# Patient Record
Sex: FEMALE | Race: WHITE | NOT HISPANIC OR LATINO | Employment: OTHER | URBAN - METROPOLITAN AREA
[De-identification: names, ages, dates, MRNs, and addresses within clinical notes are randomized per-mention and may not be internally consistent; named-entity substitution may affect disease eponyms.]

---

## 2023-12-12 ENCOUNTER — TELEMEDICINE (OUTPATIENT)
Dept: UROLOGY | Facility: CLINIC | Age: 43
End: 2023-12-12
Payer: MEDICARE

## 2023-12-12 DIAGNOSIS — N95.8 GENITOURINARY SYNDROME OF MENOPAUSE: Primary | ICD-10-CM

## 2023-12-12 DIAGNOSIS — N39.0 RECURRENT UTI: ICD-10-CM

## 2023-12-12 PROCEDURE — 99214 OFFICE O/P EST MOD 30 MIN: CPT | Performed by: STUDENT IN AN ORGANIZED HEALTH CARE EDUCATION/TRAINING PROGRAM

## 2023-12-12 RX ORDER — METHENAMINE HIPPURATE 1000 MG/1
1 TABLET ORAL 2 TIMES DAILY
Qty: 60 TABLET | Refills: 11 | Status: SHIPPED | OUTPATIENT
Start: 2023-12-12 | End: 2024-12-11

## 2023-12-12 NOTE — PROGRESS NOTES
CHIEF COMPLAINT: Virtual FUV 6 months UTI           HISTORY OF PRESENT ILLNESS:  This is a 43 y.o. female,who presents with a virtual 6 months follow up visit for a UTI.  Doing well, no infections on methenamine. No AE             HISTORY OF PRESENT ILLNESS:           Past Medical History  She has no past medical history on file.    Surgical History  She has a past surgical history that includes Other surgical history (07/12/2022); Other surgical history (07/12/2022); Other surgical history (07/12/2022); and Other surgical history (07/12/2022).     Social History  She has no history on file for tobacco use, alcohol use, and drug use.    Family History  No family history on file.     Allergies  Patient has no known allergies.      A comprehensive 10+ review of systems was negative except for: see hpi              Assessment:    43-year-old with cerebral palsy and neurogenic bladder with history of recurrent UTI     Carrie:  no UTIs since 1/2022  continue methenamine  Standing culture placed  CIC 3x/day     Follow-up in 6 months         Zachary Frederick MD

## 2024-01-12 ENCOUNTER — LAB REQUISITION (OUTPATIENT)
Dept: LAB | Facility: HOSPITAL | Age: 44
End: 2024-01-12
Payer: MEDICARE

## 2024-01-12 DIAGNOSIS — G80.0 SPASTIC QUADRIPLEGIC CEREBRAL PALSY (MULTI): ICD-10-CM

## 2024-01-12 DIAGNOSIS — Z79.899 OTHER LONG TERM (CURRENT) DRUG THERAPY: ICD-10-CM

## 2024-01-12 DIAGNOSIS — G40.911 EPILEPSY, UNSPECIFIED, INTRACTABLE, WITH STATUS EPILEPTICUS (MULTI): ICD-10-CM

## 2024-01-12 DIAGNOSIS — E55.9 VITAMIN D DEFICIENCY, UNSPECIFIED: ICD-10-CM

## 2024-01-12 DIAGNOSIS — K21.9 GASTRO-ESOPHAGEAL REFLUX DISEASE WITHOUT ESOPHAGITIS: ICD-10-CM

## 2024-01-12 DIAGNOSIS — D64.9 ANEMIA, UNSPECIFIED: ICD-10-CM

## 2024-01-12 DIAGNOSIS — Z51.81 ENCOUNTER FOR THERAPEUTIC DRUG LEVEL MONITORING: ICD-10-CM

## 2024-01-12 LAB
25(OH)D3 SERPL-MCNC: 46 NG/ML (ref 30–100)
ALBUMIN SERPL BCP-MCNC: 3.6 G/DL (ref 3.4–5)
ALBUMIN SERPL BCP-MCNC: 3.6 G/DL (ref 3.4–5)
ALP SERPL-CCNC: 169 U/L (ref 33–110)
ALP SERPL-CCNC: 169 U/L (ref 33–110)
ALT SERPL W P-5'-P-CCNC: 36 U/L (ref 7–45)
ALT SERPL W P-5'-P-CCNC: 36 U/L (ref 7–45)
ANION GAP SERPL CALC-SCNC: 11 MMOL/L (ref 10–20)
AST SERPL W P-5'-P-CCNC: 30 U/L (ref 9–39)
AST SERPL W P-5'-P-CCNC: 30 U/L (ref 9–39)
BASOPHILS # BLD AUTO: 0.02 X10*3/UL (ref 0–0.1)
BASOPHILS NFR BLD AUTO: 0.3 %
BILIRUB DIRECT SERPL-MCNC: 0.1 MG/DL (ref 0–0.3)
BILIRUB SERPL-MCNC: 0.3 MG/DL (ref 0–1.2)
BILIRUB SERPL-MCNC: 0.3 MG/DL (ref 0–1.2)
BUN SERPL-MCNC: 11 MG/DL (ref 6–23)
CALCIUM SERPL-MCNC: 8.5 MG/DL (ref 8.6–10.3)
CHLORIDE SERPL-SCNC: 103 MMOL/L (ref 98–107)
CO2 SERPL-SCNC: 26 MMOL/L (ref 21–32)
CREAT SERPL-MCNC: 0.3 MG/DL (ref 0.5–1.05)
CRP SERPL-MCNC: 0.41 MG/DL
EGFRCR SERPLBLD CKD-EPI 2021: >90 ML/MIN/1.73M*2
EOSINOPHIL # BLD AUTO: 0.14 X10*3/UL (ref 0–0.7)
EOSINOPHIL NFR BLD AUTO: 1.8 %
ERYTHROCYTE [DISTWIDTH] IN BLOOD BY AUTOMATED COUNT: 13.3 % (ref 11.5–14.5)
FERRITIN SERPL-MCNC: 29 NG/ML (ref 8–150)
GLUCOSE SERPL-MCNC: 139 MG/DL (ref 74–99)
HCT VFR BLD AUTO: 44.4 % (ref 36–46)
HGB BLD-MCNC: 15 G/DL (ref 12–16)
IMM GRANULOCYTES # BLD AUTO: 0.02 X10*3/UL (ref 0–0.7)
IMM GRANULOCYTES NFR BLD AUTO: 0.3 % (ref 0–0.9)
IRON SATN MFR SERPL: 24 % (ref 25–45)
IRON SERPL-MCNC: 85 UG/DL (ref 35–150)
LYMPHOCYTES # BLD AUTO: 2.53 X10*3/UL (ref 1.2–4.8)
LYMPHOCYTES NFR BLD AUTO: 32.9 %
MAGNESIUM SERPL-MCNC: 1.8 MG/DL (ref 1.6–2.4)
MCH RBC QN AUTO: 33 PG (ref 26–34)
MCHC RBC AUTO-ENTMCNC: 33.8 G/DL (ref 32–36)
MCV RBC AUTO: 98 FL (ref 80–100)
MONOCYTES # BLD AUTO: 0.67 X10*3/UL (ref 0.1–1)
MONOCYTES NFR BLD AUTO: 8.7 %
NEUTROPHILS # BLD AUTO: 4.32 X10*3/UL (ref 1.2–7.7)
NEUTROPHILS NFR BLD AUTO: 56 %
NRBC BLD-RTO: 0 /100 WBCS (ref 0–0)
PHENOBARB SERPL-MCNC: 28.5 UG/ML (ref 10–40)
PHOSPHATE SERPL-MCNC: 2 MG/DL (ref 2.5–4.9)
PLATELET # BLD AUTO: 181 X10*3/UL (ref 150–450)
POTASSIUM SERPL-SCNC: 3.6 MMOL/L (ref 3.5–5.3)
PROT SERPL-MCNC: 6.8 G/DL (ref 6.4–8.2)
PROT SERPL-MCNC: 6.8 G/DL (ref 6.4–8.2)
RBC # BLD AUTO: 4.54 X10*6/UL (ref 4–5.2)
SODIUM SERPL-SCNC: 136 MMOL/L (ref 136–145)
TIBC SERPL-MCNC: 361 UG/DL (ref 240–445)
TRANSFERRIN SERPL-MCNC: 285 MG/DL (ref 200–360)
UIBC SERPL-MCNC: 276 UG/DL (ref 110–370)
WBC # BLD AUTO: 7.7 X10*3/UL (ref 4.4–11.3)

## 2024-01-12 PROCEDURE — 84466 ASSAY OF TRANSFERRIN: CPT | Mod: OUT,PORLAB | Performed by: PEDIATRICS

## 2024-01-12 PROCEDURE — 84100 ASSAY OF PHOSPHORUS: CPT | Mod: OUT | Performed by: PEDIATRICS

## 2024-01-12 PROCEDURE — 83540 ASSAY OF IRON: CPT | Mod: OUT | Performed by: PEDIATRICS

## 2024-01-12 PROCEDURE — 80053 COMPREHEN METABOLIC PANEL: CPT | Mod: OUT | Performed by: PEDIATRICS

## 2024-01-12 PROCEDURE — 82306 VITAMIN D 25 HYDROXY: CPT | Mod: OUT | Performed by: PEDIATRICS

## 2024-01-12 PROCEDURE — 85025 COMPLETE CBC W/AUTO DIFF WBC: CPT | Mod: OUT | Performed by: PEDIATRICS

## 2024-01-12 PROCEDURE — 82728 ASSAY OF FERRITIN: CPT | Mod: OUT | Performed by: PEDIATRICS

## 2024-01-12 PROCEDURE — 86140 C-REACTIVE PROTEIN: CPT | Mod: OUT | Performed by: PEDIATRICS

## 2024-01-12 PROCEDURE — 83735 ASSAY OF MAGNESIUM: CPT | Mod: OUT | Performed by: PEDIATRICS

## 2024-01-12 PROCEDURE — 80076 HEPATIC FUNCTION PANEL: CPT | Mod: CCI,OUT | Performed by: PEDIATRICS

## 2024-01-12 PROCEDURE — 80184 ASSAY OF PHENOBARBITAL: CPT | Mod: OUT | Performed by: PEDIATRICS

## 2024-02-07 PROCEDURE — 87798 DETECT AGENT NOS DNA AMP: CPT | Mod: OUT,PORLAB | Performed by: PEDIATRICS

## 2024-02-07 PROCEDURE — 87636 SARSCOV2 & INF A&B AMP PRB: CPT | Mod: OUT | Performed by: PEDIATRICS

## 2024-02-08 ENCOUNTER — LAB REQUISITION (OUTPATIENT)
Dept: LAB | Facility: HOSPITAL | Age: 44
End: 2024-02-08
Payer: MEDICARE

## 2024-02-08 DIAGNOSIS — R05.9 COUGH, UNSPECIFIED: ICD-10-CM

## 2024-02-08 LAB
FLUAV RNA RESP QL NAA+PROBE: NOT DETECTED
FLUBV RNA RESP QL NAA+PROBE: NOT DETECTED
RHINOVIRUS RNA UPPER RESP QL NAA+PROBE: NOT DETECTED
SARS-COV-2 RNA RESP QL NAA+PROBE: NOT DETECTED

## 2024-02-18 ENCOUNTER — APPOINTMENT (OUTPATIENT)
Dept: RADIOLOGY | Facility: HOSPITAL | Age: 44
DRG: 377 | End: 2024-02-18
Payer: MEDICARE

## 2024-02-18 ENCOUNTER — HOSPITAL ENCOUNTER (INPATIENT)
Facility: HOSPITAL | Age: 44
LOS: 5 days | Discharge: NURSING, REHAB OR LONG-TERM CARE | DRG: 377 | End: 2024-02-23
Attending: STUDENT IN AN ORGANIZED HEALTH CARE EDUCATION/TRAINING PROGRAM | Admitting: INTERNAL MEDICINE
Payer: MEDICARE

## 2024-02-18 DIAGNOSIS — D62 ACUTE BLOOD LOSS ANEMIA: ICD-10-CM

## 2024-02-18 DIAGNOSIS — D64.9 ANEMIA, UNSPECIFIED TYPE: ICD-10-CM

## 2024-02-18 DIAGNOSIS — K25.4 CHRONIC GASTRIC ULCER WITH HEMORRHAGE: ICD-10-CM

## 2024-02-18 DIAGNOSIS — K59.09 OTHER CONSTIPATION: ICD-10-CM

## 2024-02-18 DIAGNOSIS — K92.2 GASTROINTESTINAL HEMORRHAGE, UNSPECIFIED GASTROINTESTINAL HEMORRHAGE TYPE: Primary | ICD-10-CM

## 2024-02-18 PROBLEM — J45.909 ASTHMA (HHS-HCC): Status: ACTIVE | Noted: 2024-02-18

## 2024-02-18 PROBLEM — G80.9 CEREBRAL PALSY (MULTI): Status: ACTIVE | Noted: 2024-02-18

## 2024-02-18 PROBLEM — J45.909 ASTHMA (HHS-HCC): Chronic | Status: ACTIVE | Noted: 2024-02-18

## 2024-02-18 PROBLEM — G40.909 EPILEPSY (MULTI): Chronic | Status: ACTIVE | Noted: 2023-07-12

## 2024-02-18 PROBLEM — F73 PROFOUND INTELLECTUAL DISABILITY: Chronic | Status: ACTIVE | Noted: 2017-02-21

## 2024-02-18 PROBLEM — G40.909 EPILEPSY (MULTI): Status: ACTIVE | Noted: 2023-07-12

## 2024-02-18 PROBLEM — G80.9 CEREBRAL PALSY (MULTI): Chronic | Status: ACTIVE | Noted: 2024-02-18

## 2024-02-18 PROBLEM — N39.0 RECURRENT UTI: Chronic | Status: ACTIVE | Noted: 2017-02-21

## 2024-02-18 PROBLEM — N39.0 RECURRENT UTI: Status: ACTIVE | Noted: 2017-02-21

## 2024-02-18 PROBLEM — N31.9 NEUROGENIC BLADDER: Status: ACTIVE | Noted: 2024-02-18

## 2024-02-18 PROBLEM — N31.9 NEUROGENIC BLADDER: Chronic | Status: ACTIVE | Noted: 2024-02-18

## 2024-02-18 LAB
ABO GROUP (TYPE) IN BLOOD: NORMAL
ALBUMIN SERPL BCP-MCNC: 3.4 G/DL (ref 3.4–5)
ALP SERPL-CCNC: 161 U/L (ref 33–110)
ALT SERPL W P-5'-P-CCNC: 80 U/L (ref 7–45)
ANION GAP SERPL CALC-SCNC: 10 MMOL/L (ref 10–20)
ANTIBODY SCREEN: NORMAL
AST SERPL W P-5'-P-CCNC: 61 U/L (ref 9–39)
BASOPHILS # BLD AUTO: 0.02 X10*3/UL (ref 0–0.1)
BASOPHILS NFR BLD AUTO: 0.1 %
BILIRUB DIRECT SERPL-MCNC: 0.1 MG/DL (ref 0–0.3)
BILIRUB SERPL-MCNC: 0.2 MG/DL (ref 0–1.2)
BUN SERPL-MCNC: 31 MG/DL (ref 6–23)
CALCIUM SERPL-MCNC: 8.1 MG/DL (ref 8.6–10.3)
CHLORIDE SERPL-SCNC: 103 MMOL/L (ref 98–107)
CO2 SERPL-SCNC: 28 MMOL/L (ref 21–32)
CREAT SERPL-MCNC: 0.33 MG/DL (ref 0.5–1.05)
EGFRCR SERPLBLD CKD-EPI 2021: >90 ML/MIN/1.73M*2
EOSINOPHIL # BLD AUTO: 0 X10*3/UL (ref 0–0.7)
EOSINOPHIL NFR BLD AUTO: 0 %
ERYTHROCYTE [DISTWIDTH] IN BLOOD BY AUTOMATED COUNT: 12.9 % (ref 11.5–14.5)
GLUCOSE SERPL-MCNC: 149 MG/DL (ref 74–99)
HCT VFR BLD AUTO: 31 % (ref 36–46)
HGB BLD-MCNC: 10.3 G/DL (ref 12–16)
IMM GRANULOCYTES # BLD AUTO: 0.14 X10*3/UL (ref 0–0.7)
IMM GRANULOCYTES NFR BLD AUTO: 0.8 % (ref 0–0.9)
LACTATE SERPL-SCNC: 1.7 MMOL/L (ref 0.4–2)
LIPASE SERPL-CCNC: 31 U/L (ref 9–82)
LYMPHOCYTES # BLD AUTO: 1.84 X10*3/UL (ref 1.2–4.8)
LYMPHOCYTES NFR BLD AUTO: 10.7 %
MCH RBC QN AUTO: 33.1 PG (ref 26–34)
MCHC RBC AUTO-ENTMCNC: 33.2 G/DL (ref 32–36)
MCV RBC AUTO: 100 FL (ref 80–100)
MONOCYTES # BLD AUTO: 1.32 X10*3/UL (ref 0.1–1)
MONOCYTES NFR BLD AUTO: 7.7 %
NEUTROPHILS # BLD AUTO: 13.89 X10*3/UL (ref 1.2–7.7)
NEUTROPHILS NFR BLD AUTO: 80.7 %
NRBC BLD-RTO: 0 /100 WBCS (ref 0–0)
PLATELET # BLD AUTO: 331 X10*3/UL (ref 150–450)
POTASSIUM SERPL-SCNC: 3.8 MMOL/L (ref 3.5–5.3)
PROT SERPL-MCNC: 6.5 G/DL (ref 6.4–8.2)
RBC # BLD AUTO: 3.11 X10*6/UL (ref 4–5.2)
RH FACTOR (ANTIGEN D): NORMAL
SODIUM SERPL-SCNC: 137 MMOL/L (ref 136–145)
WBC # BLD AUTO: 17.2 X10*3/UL (ref 4.4–11.3)

## 2024-02-18 PROCEDURE — 71045 X-RAY EXAM CHEST 1 VIEW: CPT | Performed by: STUDENT IN AN ORGANIZED HEALTH CARE EDUCATION/TRAINING PROGRAM

## 2024-02-18 PROCEDURE — 2060000001 HC INTERMEDIATE ICU ROOM DAILY

## 2024-02-18 PROCEDURE — 83605 ASSAY OF LACTIC ACID: CPT | Performed by: NURSE PRACTITIONER

## 2024-02-18 PROCEDURE — 71045 X-RAY EXAM CHEST 1 VIEW: CPT

## 2024-02-18 PROCEDURE — 2500000004 HC RX 250 GENERAL PHARMACY W/ HCPCS (ALT 636 FOR OP/ED): Performed by: STUDENT IN AN ORGANIZED HEALTH CARE EDUCATION/TRAINING PROGRAM

## 2024-02-18 PROCEDURE — 86920 COMPATIBILITY TEST SPIN: CPT

## 2024-02-18 PROCEDURE — 86901 BLOOD TYPING SEROLOGIC RH(D): CPT | Performed by: NURSE PRACTITIONER

## 2024-02-18 PROCEDURE — 99285 EMERGENCY DEPT VISIT HI MDM: CPT | Mod: 25

## 2024-02-18 PROCEDURE — 99223 1ST HOSP IP/OBS HIGH 75: CPT | Performed by: STUDENT IN AN ORGANIZED HEALTH CARE EDUCATION/TRAINING PROGRAM

## 2024-02-18 PROCEDURE — 80048 BASIC METABOLIC PNL TOTAL CA: CPT | Performed by: NURSE PRACTITIONER

## 2024-02-18 PROCEDURE — 2500000004 HC RX 250 GENERAL PHARMACY W/ HCPCS (ALT 636 FOR OP/ED): Performed by: NURSE PRACTITIONER

## 2024-02-18 PROCEDURE — 36415 COLL VENOUS BLD VENIPUNCTURE: CPT | Performed by: NURSE PRACTITIONER

## 2024-02-18 PROCEDURE — 2550000001 HC RX 255 CONTRASTS: Performed by: STUDENT IN AN ORGANIZED HEALTH CARE EDUCATION/TRAINING PROGRAM

## 2024-02-18 PROCEDURE — 74174 CTA ABD&PLVS W/CONTRAST: CPT

## 2024-02-18 PROCEDURE — 74174 CTA ABD&PLVS W/CONTRAST: CPT | Performed by: RADIOLOGY

## 2024-02-18 PROCEDURE — 82248 BILIRUBIN DIRECT: CPT | Performed by: NURSE PRACTITIONER

## 2024-02-18 PROCEDURE — 85025 COMPLETE CBC W/AUTO DIFF WBC: CPT | Performed by: NURSE PRACTITIONER

## 2024-02-18 PROCEDURE — 83690 ASSAY OF LIPASE: CPT | Performed by: NURSE PRACTITIONER

## 2024-02-18 RX ORDER — BISACODYL 5 MG
10 TABLET, DELAYED RELEASE (ENTERIC COATED) ORAL DAILY PRN
Status: DISCONTINUED | OUTPATIENT
Start: 2024-02-18 | End: 2024-02-23 | Stop reason: HOSPADM

## 2024-02-18 RX ORDER — PHENOBARBITAL 32.4 MG/1
64.8 TABLET ORAL EVERY MORNING
Status: DISCONTINUED | OUTPATIENT
Start: 2024-02-19 | End: 2024-02-23 | Stop reason: HOSPADM

## 2024-02-18 RX ORDER — SODIUM CHLORIDE, SODIUM LACTATE, POTASSIUM CHLORIDE, CALCIUM CHLORIDE 600; 310; 30; 20 MG/100ML; MG/100ML; MG/100ML; MG/100ML
75 INJECTION, SOLUTION INTRAVENOUS CONTINUOUS
Status: ACTIVE | OUTPATIENT
Start: 2024-02-18 | End: 2024-02-19

## 2024-02-18 RX ORDER — ONDANSETRON HYDROCHLORIDE 2 MG/ML
4 INJECTION, SOLUTION INTRAVENOUS EVERY 8 HOURS PRN
Status: DISCONTINUED | OUTPATIENT
Start: 2024-02-18 | End: 2024-02-23 | Stop reason: HOSPADM

## 2024-02-18 RX ORDER — PHENOBARBITAL 32.4 MG/1
64.8 TABLET ORAL ONCE
Status: DISCONTINUED | OUTPATIENT
Start: 2024-02-18 | End: 2024-02-20

## 2024-02-18 RX ORDER — ONDANSETRON 4 MG/1
4 TABLET, FILM COATED ORAL EVERY 8 HOURS PRN
Status: DISCONTINUED | OUTPATIENT
Start: 2024-02-18 | End: 2024-02-23 | Stop reason: HOSPADM

## 2024-02-18 RX ORDER — POLYETHYLENE GLYCOL 3350 17 G/17G
17 POWDER, FOR SOLUTION ORAL DAILY
Status: DISCONTINUED | OUTPATIENT
Start: 2024-02-19 | End: 2024-02-23 | Stop reason: HOSPADM

## 2024-02-18 RX ORDER — BISACODYL 10 MG/1
10 SUPPOSITORY RECTAL DAILY PRN
Status: DISCONTINUED | OUTPATIENT
Start: 2024-02-18 | End: 2024-02-23 | Stop reason: HOSPADM

## 2024-02-18 RX ORDER — GUAIFENESIN 600 MG/1
600 TABLET, EXTENDED RELEASE ORAL EVERY 12 HOURS PRN
Status: DISCONTINUED | OUTPATIENT
Start: 2024-02-18 | End: 2024-02-23 | Stop reason: HOSPADM

## 2024-02-18 RX ORDER — TALC
3 POWDER (GRAM) TOPICAL DAILY
Status: DISCONTINUED | OUTPATIENT
Start: 2024-02-18 | End: 2024-02-23 | Stop reason: HOSPADM

## 2024-02-18 RX ORDER — PHENOBARBITAL 32.4 MG/1
97.2 TABLET ORAL NIGHTLY
Status: DISCONTINUED | OUTPATIENT
Start: 2024-02-19 | End: 2024-02-23 | Stop reason: HOSPADM

## 2024-02-18 RX ORDER — BACLOFEN 10 MG/1
15 TABLET ORAL 3 TIMES DAILY
Status: DISCONTINUED | OUTPATIENT
Start: 2024-02-19 | End: 2024-02-23 | Stop reason: HOSPADM

## 2024-02-18 RX ORDER — PANTOPRAZOLE SODIUM 40 MG/10ML
40 INJECTION, POWDER, LYOPHILIZED, FOR SOLUTION INTRAVENOUS 2 TIMES DAILY
Status: DISCONTINUED | OUTPATIENT
Start: 2024-02-18 | End: 2024-02-23 | Stop reason: HOSPADM

## 2024-02-18 RX ORDER — BACLOFEN 10 MG/1
15 TABLET ORAL ONCE
Status: DISCONTINUED | OUTPATIENT
Start: 2024-02-18 | End: 2024-02-20

## 2024-02-18 RX ADMIN — SODIUM CHLORIDE, POTASSIUM CHLORIDE, SODIUM LACTATE AND CALCIUM CHLORIDE 75 ML/HR: 600; 310; 30; 20 INJECTION, SOLUTION INTRAVENOUS at 23:46

## 2024-02-18 RX ADMIN — IOHEXOL 65 ML: 350 INJECTION, SOLUTION INTRAVENOUS at 19:47

## 2024-02-18 RX ADMIN — SODIUM CHLORIDE 500 ML: 9 INJECTION, SOLUTION INTRAVENOUS at 20:47

## 2024-02-18 ASSESSMENT — PAIN - FUNCTIONAL ASSESSMENT
PAIN_FUNCTIONAL_ASSESSMENT: WONG-BAKER FACES
PAIN_FUNCTIONAL_ASSESSMENT: PAINAD (PAIN ASSESSMENT IN ADVANCED DEMENTIA SCALE)

## 2024-02-18 ASSESSMENT — PAIN SCALES - PAIN ASSESSMENT IN ADVANCED DEMENTIA (PAINAD)
FACIALEXPRESSION: SMILING OR INEXPRESSIVE
BREATHING: NORMAL
TOTALSCORE: 0
CONSOLABILITY: NO NEED TO CONSOLE
BODYLANGUAGE: RELAXED

## 2024-02-18 ASSESSMENT — LIFESTYLE VARIABLES
EVER FELT BAD OR GUILTY ABOUT YOUR DRINKING: NO
HAVE YOU EVER FELT YOU SHOULD CUT DOWN ON YOUR DRINKING: NO
EVER HAD A DRINK FIRST THING IN THE MORNING TO STEADY YOUR NERVES TO GET RID OF A HANGOVER: NO
HAVE PEOPLE ANNOYED YOU BY CRITICIZING YOUR DRINKING: NO

## 2024-02-18 ASSESSMENT — COLUMBIA-SUICIDE SEVERITY RATING SCALE - C-SSRS
2. HAVE YOU ACTUALLY HAD ANY THOUGHTS OF KILLING YOURSELF?: NO
1. IN THE PAST MONTH, HAVE YOU WISHED YOU WERE DEAD OR WISHED YOU COULD GO TO SLEEP AND NOT WAKE UP?: NO
6. HAVE YOU EVER DONE ANYTHING, STARTED TO DO ANYTHING, OR PREPARED TO DO ANYTHING TO END YOUR LIFE?: NO

## 2024-02-18 ASSESSMENT — PAIN SCALES - WONG BAKER: WONGBAKER_NUMERICALRESPONSE: HURTS WHOLE LOT

## 2024-02-19 ENCOUNTER — ANESTHESIA EVENT (OUTPATIENT)
Dept: OPERATING ROOM | Facility: HOSPITAL | Age: 44
DRG: 377 | End: 2024-02-19
Payer: MEDICARE

## 2024-02-19 ENCOUNTER — ANESTHESIA (OUTPATIENT)
Dept: OPERATING ROOM | Facility: HOSPITAL | Age: 44
DRG: 377 | End: 2024-02-19
Payer: MEDICARE

## 2024-02-19 ENCOUNTER — APPOINTMENT (OUTPATIENT)
Dept: OPERATING ROOM | Facility: HOSPITAL | Age: 44
DRG: 377 | End: 2024-02-19
Payer: MEDICARE

## 2024-02-19 LAB
ABO GROUP (TYPE) IN BLOOD: NORMAL
APPEARANCE UR: ABNORMAL
BACTERIA #/AREA URNS AUTO: ABNORMAL /HPF
BILIRUB UR STRIP.AUTO-MCNC: NEGATIVE MG/DL
COLOR UR: ABNORMAL
D DIMER PPP FEU-MCNC: 663 NG/ML FEU
FOLATE SERPL-MCNC: 19.1 NG/ML
GLUCOSE UR STRIP.AUTO-MCNC: NEGATIVE MG/DL
HCT VFR BLD AUTO: 22.2 % (ref 36–46)
HCT VFR BLD AUTO: 31 % (ref 36–46)
HCT VFR BLD AUTO: 32.9 % (ref 36–46)
HGB BLD-MCNC: 10.6 G/DL (ref 12–16)
HGB BLD-MCNC: 11.4 G/DL (ref 12–16)
HGB BLD-MCNC: 7.3 G/DL (ref 12–16)
IRON SATN MFR SERPL: 16 % (ref 25–45)
IRON SERPL-MCNC: 45 UG/DL (ref 35–150)
KETONES UR STRIP.AUTO-MCNC: NEGATIVE MG/DL
LDH SERPL L TO P-CCNC: 176 U/L (ref 84–246)
LEUKOCYTE ESTERASE UR QL STRIP.AUTO: ABNORMAL
NITRITE UR QL STRIP.AUTO: NEGATIVE
PH UR STRIP.AUTO: 6 [PH]
PHENOBARB SERPL-MCNC: 20.5 UG/ML (ref 10–40)
PROT UR STRIP.AUTO-MCNC: ABNORMAL MG/DL
RBC # UR STRIP.AUTO: ABNORMAL /UL
RBC #/AREA URNS AUTO: >20 /HPF
RH FACTOR (ANTIGEN D): NORMAL
SP GR UR STRIP.AUTO: 1.01
TIBC SERPL-MCNC: 280 UG/DL (ref 240–445)
UIBC SERPL-MCNC: 235 UG/DL (ref 110–370)
UROBILINOGEN UR STRIP.AUTO-MCNC: <2 MG/DL
VIT B12 SERPL-MCNC: 564 PG/ML (ref 211–911)
WBC #/AREA URNS AUTO: ABNORMAL /HPF

## 2024-02-19 PROCEDURE — 2060000001 HC INTERMEDIATE ICU ROOM DAILY

## 2024-02-19 PROCEDURE — 3700000001 HC GENERAL ANESTHESIA TIME - INITIAL BASE CHARGE: Performed by: NURSE ANESTHETIST, CERTIFIED REGISTERED

## 2024-02-19 PROCEDURE — 85379 FIBRIN DEGRADATION QUANT: CPT | Performed by: STUDENT IN AN ORGANIZED HEALTH CARE EDUCATION/TRAINING PROGRAM

## 2024-02-19 PROCEDURE — 94640 AIRWAY INHALATION TREATMENT: CPT

## 2024-02-19 PROCEDURE — 30233N1 TRANSFUSION OF NONAUTOLOGOUS RED BLOOD CELLS INTO PERIPHERAL VEIN, PERCUTANEOUS APPROACH: ICD-10-PCS | Performed by: FAMILY MEDICINE

## 2024-02-19 PROCEDURE — 2500000004 HC RX 250 GENERAL PHARMACY W/ HCPCS (ALT 636 FOR OP/ED): Performed by: NURSE ANESTHETIST, CERTIFIED REGISTERED

## 2024-02-19 PROCEDURE — 99221 1ST HOSP IP/OBS SF/LOW 40: CPT | Performed by: INTERNAL MEDICINE

## 2024-02-19 PROCEDURE — 2500000005 HC RX 250 GENERAL PHARMACY W/O HCPCS: Performed by: STUDENT IN AN ORGANIZED HEALTH CARE EDUCATION/TRAINING PROGRAM

## 2024-02-19 PROCEDURE — 2500000004 HC RX 250 GENERAL PHARMACY W/ HCPCS (ALT 636 FOR OP/ED): Performed by: INTERNAL MEDICINE

## 2024-02-19 PROCEDURE — 99233 SBSQ HOSP IP/OBS HIGH 50: CPT | Performed by: FAMILY MEDICINE

## 2024-02-19 PROCEDURE — 3600000002 HC OR TIME - INITIAL BASE CHARGE - PROCEDURE LEVEL TWO: Performed by: NURSE ANESTHETIST, CERTIFIED REGISTERED

## 2024-02-19 PROCEDURE — 3700000002 HC GENERAL ANESTHESIA TIME - EACH INCREMENTAL 1 MINUTE: Performed by: NURSE ANESTHETIST, CERTIFIED REGISTERED

## 2024-02-19 PROCEDURE — 80184 ASSAY OF PHENOBARBITAL: CPT | Performed by: STUDENT IN AN ORGANIZED HEALTH CARE EDUCATION/TRAINING PROGRAM

## 2024-02-19 PROCEDURE — 7100000001 HC RECOVERY ROOM TIME - INITIAL BASE CHARGE: Performed by: NURSE ANESTHETIST, CERTIFIED REGISTERED

## 2024-02-19 PROCEDURE — 2500000002 HC RX 250 W HCPCS SELF ADMINISTERED DRUGS (ALT 637 FOR MEDICARE OP, ALT 636 FOR OP/ED): Performed by: STUDENT IN AN ORGANIZED HEALTH CARE EDUCATION/TRAINING PROGRAM

## 2024-02-19 PROCEDURE — C9113 INJ PANTOPRAZOLE SODIUM, VIA: HCPCS | Performed by: STUDENT IN AN ORGANIZED HEALTH CARE EDUCATION/TRAINING PROGRAM

## 2024-02-19 PROCEDURE — 2500000001 HC RX 250 WO HCPCS SELF ADMINISTERED DRUGS (ALT 637 FOR MEDICARE OP): Performed by: STUDENT IN AN ORGANIZED HEALTH CARE EDUCATION/TRAINING PROGRAM

## 2024-02-19 PROCEDURE — 82746 ASSAY OF FOLIC ACID SERUM: CPT | Performed by: STUDENT IN AN ORGANIZED HEALTH CARE EDUCATION/TRAINING PROGRAM

## 2024-02-19 PROCEDURE — 2500000005 HC RX 250 GENERAL PHARMACY W/O HCPCS: Performed by: NURSE ANESTHETIST, CERTIFIED REGISTERED

## 2024-02-19 PROCEDURE — 87040 BLOOD CULTURE FOR BACTERIA: CPT | Mod: PORLAB | Performed by: STUDENT IN AN ORGANIZED HEALTH CARE EDUCATION/TRAINING PROGRAM

## 2024-02-19 PROCEDURE — 82607 VITAMIN B-12: CPT | Performed by: STUDENT IN AN ORGANIZED HEALTH CARE EDUCATION/TRAINING PROGRAM

## 2024-02-19 PROCEDURE — 2500000004 HC RX 250 GENERAL PHARMACY W/ HCPCS (ALT 636 FOR OP/ED): Performed by: NURSE PRACTITIONER

## 2024-02-19 PROCEDURE — 43235 EGD DIAGNOSTIC BRUSH WASH: CPT | Performed by: INTERNAL MEDICINE

## 2024-02-19 PROCEDURE — 85014 HEMATOCRIT: CPT | Performed by: STUDENT IN AN ORGANIZED HEALTH CARE EDUCATION/TRAINING PROGRAM

## 2024-02-19 PROCEDURE — 81001 URINALYSIS AUTO W/SCOPE: CPT | Performed by: STUDENT IN AN ORGANIZED HEALTH CARE EDUCATION/TRAINING PROGRAM

## 2024-02-19 PROCEDURE — 7100000002 HC RECOVERY ROOM TIME - EACH INCREMENTAL 1 MINUTE: Performed by: NURSE ANESTHETIST, CERTIFIED REGISTERED

## 2024-02-19 PROCEDURE — 36415 COLL VENOUS BLD VENIPUNCTURE: CPT | Performed by: STUDENT IN AN ORGANIZED HEALTH CARE EDUCATION/TRAINING PROGRAM

## 2024-02-19 PROCEDURE — 36430 TRANSFUSION BLD/BLD COMPNT: CPT

## 2024-02-19 PROCEDURE — 3600000007 HC OR TIME - EACH INCREMENTAL 1 MINUTE - PROCEDURE LEVEL TWO: Performed by: NURSE ANESTHETIST, CERTIFIED REGISTERED

## 2024-02-19 PROCEDURE — 83540 ASSAY OF IRON: CPT | Performed by: STUDENT IN AN ORGANIZED HEALTH CARE EDUCATION/TRAINING PROGRAM

## 2024-02-19 PROCEDURE — 2500000004 HC RX 250 GENERAL PHARMACY W/ HCPCS (ALT 636 FOR OP/ED): Performed by: STUDENT IN AN ORGANIZED HEALTH CARE EDUCATION/TRAINING PROGRAM

## 2024-02-19 PROCEDURE — 2500000004 HC RX 250 GENERAL PHARMACY W/ HCPCS (ALT 636 FOR OP/ED): Performed by: ANESTHESIOLOGY

## 2024-02-19 PROCEDURE — 0DJ08ZZ INSPECTION OF UPPER INTESTINAL TRACT, VIA NATURAL OR ARTIFICIAL OPENING ENDOSCOPIC: ICD-10-PCS | Performed by: INTERNAL MEDICINE

## 2024-02-19 PROCEDURE — 83615 LACTATE (LD) (LDH) ENZYME: CPT | Performed by: STUDENT IN AN ORGANIZED HEALTH CARE EDUCATION/TRAINING PROGRAM

## 2024-02-19 PROCEDURE — 2500000004 HC RX 250 GENERAL PHARMACY W/ HCPCS (ALT 636 FOR OP/ED): Performed by: FAMILY MEDICINE

## 2024-02-19 PROCEDURE — P9016 RBC LEUKOCYTES REDUCED: HCPCS

## 2024-02-19 RX ORDER — ONDANSETRON HYDROCHLORIDE 2 MG/ML
4 INJECTION, SOLUTION INTRAVENOUS ONCE AS NEEDED
Status: DISCONTINUED | OUTPATIENT
Start: 2024-02-19 | End: 2024-02-19 | Stop reason: HOSPADM

## 2024-02-19 RX ORDER — ASCORBIC ACID 250 MG
250 TABLET ORAL 2 TIMES DAILY
COMMUNITY

## 2024-02-19 RX ORDER — MORPHINE SULFATE 2 MG/ML
1 INJECTION, SOLUTION INTRAMUSCULAR; INTRAVENOUS EVERY 5 MIN PRN
Status: CANCELLED | OUTPATIENT
Start: 2024-02-19

## 2024-02-19 RX ORDER — BISACODYL 10 MG/1
10 SUPPOSITORY RECTAL AS NEEDED
COMMUNITY

## 2024-02-19 RX ORDER — ALBUTEROL SULFATE 90 UG/1
2 AEROSOL, METERED RESPIRATORY (INHALATION) DAILY
COMMUNITY
End: 2024-02-23 | Stop reason: HOSPADM

## 2024-02-19 RX ORDER — LIDOCAINE HYDROCHLORIDE 10 MG/ML
0.1 INJECTION, SOLUTION EPIDURAL; INFILTRATION; INTRACAUDAL; PERINEURAL ONCE
Status: DISCONTINUED | OUTPATIENT
Start: 2024-02-19 | End: 2024-02-19 | Stop reason: HOSPADM

## 2024-02-19 RX ORDER — TRIPROLIDINE/PSEUDOEPHEDRINE 2.5MG-60MG
TABLET ORAL EVERY 6 HOURS
COMMUNITY

## 2024-02-19 RX ORDER — SODIUM CHLORIDE, SODIUM LACTATE, POTASSIUM CHLORIDE, CALCIUM CHLORIDE 600; 310; 30; 20 MG/100ML; MG/100ML; MG/100ML; MG/100ML
100 INJECTION, SOLUTION INTRAVENOUS CONTINUOUS
Status: CANCELLED | OUTPATIENT
Start: 2024-02-19

## 2024-02-19 RX ORDER — OXYCODONE HYDROCHLORIDE 5 MG/1
5 TABLET ORAL EVERY 4 HOURS PRN
Status: DISCONTINUED | OUTPATIENT
Start: 2024-02-19 | End: 2024-02-19 | Stop reason: HOSPADM

## 2024-02-19 RX ORDER — ONDANSETRON HYDROCHLORIDE 2 MG/ML
4 INJECTION, SOLUTION INTRAVENOUS ONCE AS NEEDED
Status: CANCELLED | OUTPATIENT
Start: 2024-02-19

## 2024-02-19 RX ORDER — FAMOTIDINE 10 MG/ML
20 INJECTION INTRAVENOUS ONCE
Status: COMPLETED | OUTPATIENT
Start: 2024-02-19 | End: 2024-02-19

## 2024-02-19 RX ORDER — PHENOBARBITAL 64.8 MG/1
1.5 TABLET ORAL
COMMUNITY

## 2024-02-19 RX ORDER — FAMOTIDINE 10 MG/ML
20 INJECTION INTRAVENOUS ONCE
Status: CANCELLED | OUTPATIENT
Start: 2024-02-19 | End: 2024-02-19

## 2024-02-19 RX ORDER — BACLOFEN 10 MG/1
1.5 TABLET ORAL 3 TIMES DAILY
COMMUNITY

## 2024-02-19 RX ORDER — BISMUTH SUBSALICYLATE 262 MG
1 TABLET,CHEWABLE ORAL DAILY
COMMUNITY

## 2024-02-19 RX ORDER — PHENOBARBITAL 64.8 MG/1
64.8 TABLET ORAL EVERY MORNING
COMMUNITY

## 2024-02-19 RX ORDER — CHOLECALCIFEROL (VITAMIN D3) 25 MCG
1000 TABLET ORAL DAILY
COMMUNITY

## 2024-02-19 RX ORDER — PROPOFOL 10 MG/ML
INJECTION, EMULSION INTRAVENOUS AS NEEDED
Status: DISCONTINUED | OUTPATIENT
Start: 2024-02-19 | End: 2024-02-19

## 2024-02-19 RX ORDER — SODIUM CHLORIDE, SODIUM LACTATE, POTASSIUM CHLORIDE, CALCIUM CHLORIDE 600; 310; 30; 20 MG/100ML; MG/100ML; MG/100ML; MG/100ML
100 INJECTION, SOLUTION INTRAVENOUS CONTINUOUS
Status: DISCONTINUED | OUTPATIENT
Start: 2024-02-19 | End: 2024-02-19

## 2024-02-19 RX ORDER — MORPHINE SULFATE 2 MG/ML
1 INJECTION, SOLUTION INTRAMUSCULAR; INTRAVENOUS EVERY 5 MIN PRN
Status: DISCONTINUED | OUTPATIENT
Start: 2024-02-19 | End: 2024-02-19 | Stop reason: HOSPADM

## 2024-02-19 RX ORDER — FLUTICASONE PROPIONATE 110 UG/1
2 AEROSOL, METERED RESPIRATORY (INHALATION)
COMMUNITY

## 2024-02-19 RX ORDER — ALBUTEROL SULFATE 5 MG/ML
2.5 SOLUTION RESPIRATORY (INHALATION) EVERY 6 HOURS PRN
Status: ON HOLD | COMMUNITY
End: 2024-02-19 | Stop reason: ENTERED-IN-ERROR

## 2024-02-19 RX ORDER — CALCIUM CARBONATE/VITAMIN D3 250-3.125
1 TABLET ORAL
Status: ON HOLD | COMMUNITY
End: 2024-02-19 | Stop reason: ENTERED-IN-ERROR

## 2024-02-19 RX ORDER — LIDOCAINE HYDROCHLORIDE 10 MG/ML
0.1 INJECTION, SOLUTION EPIDURAL; INFILTRATION; INTRACAUDAL; PERINEURAL ONCE
Status: CANCELLED | OUTPATIENT
Start: 2024-02-19 | End: 2024-02-19

## 2024-02-19 RX ORDER — SODIUM CHLORIDE, SODIUM LACTATE, POTASSIUM CHLORIDE, CALCIUM CHLORIDE 600; 310; 30; 20 MG/100ML; MG/100ML; MG/100ML; MG/100ML
85 INJECTION, SOLUTION INTRAVENOUS CONTINUOUS
Status: ACTIVE | OUTPATIENT
Start: 2024-02-19 | End: 2024-02-20

## 2024-02-19 RX ORDER — SODIUM CHLORIDE, SODIUM LACTATE, POTASSIUM CHLORIDE, CALCIUM CHLORIDE 600; 310; 30; 20 MG/100ML; MG/100ML; MG/100ML; MG/100ML
100 INJECTION, SOLUTION INTRAVENOUS CONTINUOUS
Status: DISCONTINUED | OUTPATIENT
Start: 2024-02-19 | End: 2024-02-19 | Stop reason: HOSPADM

## 2024-02-19 RX ORDER — ALBUTEROL SULFATE 90 UG/1
2 AEROSOL, METERED RESPIRATORY (INHALATION)
COMMUNITY

## 2024-02-19 RX ORDER — DIAZEPAM 10 MG/2G
10 GEL RECTAL EVERY 6 HOURS PRN
COMMUNITY

## 2024-02-19 RX ORDER — POLYETHYLENE GLYCOL 3350 17 G/17G
15 POWDER, FOR SOLUTION ORAL 2 TIMES DAILY
Status: ON HOLD | COMMUNITY
End: 2024-02-23 | Stop reason: SDUPTHER

## 2024-02-19 RX ORDER — FERROUS SULFATE, DRIED 160(50) MG
1 TABLET, EXTENDED RELEASE ORAL DAILY
COMMUNITY

## 2024-02-19 RX ORDER — CEFTRIAXONE 1 G/50ML
1 INJECTION, SOLUTION INTRAVENOUS EVERY 24 HOURS
Status: DISCONTINUED | OUTPATIENT
Start: 2024-02-19 | End: 2024-02-23 | Stop reason: HOSPADM

## 2024-02-19 RX ORDER — FENTANYL CITRATE 50 UG/ML
INJECTION, SOLUTION INTRAMUSCULAR; INTRAVENOUS AS NEEDED
Status: DISCONTINUED | OUTPATIENT
Start: 2024-02-19 | End: 2024-02-19

## 2024-02-19 RX ORDER — ACETAMINOPHEN 160 MG/5ML
LIQUID ORAL EVERY 4 HOURS PRN
COMMUNITY

## 2024-02-19 RX ORDER — LIDOCAINE HCL/PF 100 MG/5ML
SYRINGE (ML) INTRAVENOUS AS NEEDED
Status: DISCONTINUED | OUTPATIENT
Start: 2024-02-19 | End: 2024-02-19

## 2024-02-19 RX ORDER — OXYCODONE HYDROCHLORIDE 5 MG/1
5 TABLET ORAL EVERY 4 HOURS PRN
Status: CANCELLED | OUTPATIENT
Start: 2024-02-19

## 2024-02-19 RX ADMIN — FAMOTIDINE 20 MG: 10 INJECTION, SOLUTION INTRAVENOUS at 12:32

## 2024-02-19 RX ADMIN — MOMETASONE FUROATE 1 PUFF: 220 INHALANT RESPIRATORY (INHALATION) at 06:26

## 2024-02-19 RX ADMIN — PHENOBARBITAL 97.2 MG: 32.4 TABLET ORAL at 20:25

## 2024-02-19 RX ADMIN — BACLOFEN 15 MG: 10 TABLET ORAL at 15:39

## 2024-02-19 RX ADMIN — CEFTRIAXONE SODIUM 1 G: 1 INJECTION, SOLUTION INTRAVENOUS at 08:52

## 2024-02-19 RX ADMIN — PANTOPRAZOLE SODIUM 40 MG: 40 INJECTION, POWDER, FOR SOLUTION INTRAVENOUS at 08:34

## 2024-02-19 RX ADMIN — SODIUM PHOSPHATE 1 ENEMA: 7; 19 ENEMA RECTAL at 00:49

## 2024-02-19 RX ADMIN — SODIUM CHLORIDE 500 ML: 9 INJECTION, SOLUTION INTRAVENOUS at 00:40

## 2024-02-19 RX ADMIN — SODIUM CHLORIDE, POTASSIUM CHLORIDE, SODIUM LACTATE AND CALCIUM CHLORIDE 85 ML/HR: 600; 310; 30; 20 INJECTION, SOLUTION INTRAVENOUS at 20:28

## 2024-02-19 RX ADMIN — PROPOFOL 50 MG: 10 INJECTION, EMULSION INTRAVENOUS at 13:13

## 2024-02-19 RX ADMIN — SODIUM CHLORIDE, POTASSIUM CHLORIDE, SODIUM LACTATE AND CALCIUM CHLORIDE 100 ML/HR: 600; 310; 30; 20 INJECTION, SOLUTION INTRAVENOUS at 12:32

## 2024-02-19 RX ADMIN — PANTOPRAZOLE SODIUM 40 MG: 40 INJECTION, POWDER, FOR SOLUTION INTRAVENOUS at 00:46

## 2024-02-19 RX ADMIN — FENTANYL CITRATE 25 MCG: 50 INJECTION INTRAMUSCULAR; INTRAVENOUS at 13:06

## 2024-02-19 RX ADMIN — BACLOFEN 15 MG: 10 TABLET ORAL at 08:34

## 2024-02-19 RX ADMIN — ONDANSETRON 4 MG: 2 INJECTION INTRAMUSCULAR; INTRAVENOUS at 01:51

## 2024-02-19 RX ADMIN — LIDOCAINE HYDROCHLORIDE 50 MG: 20 INJECTION, SOLUTION INTRAVENOUS at 13:06

## 2024-02-19 RX ADMIN — BACLOFEN 15 MG: 10 TABLET ORAL at 01:05

## 2024-02-19 RX ADMIN — PANTOPRAZOLE SODIUM 40 MG: 40 INJECTION, POWDER, FOR SOLUTION INTRAVENOUS at 20:25

## 2024-02-19 RX ADMIN — BACLOFEN 15 MG: 10 TABLET ORAL at 20:24

## 2024-02-19 RX ADMIN — Medication 3 MG: at 01:05

## 2024-02-19 RX ADMIN — PHENOBARBITAL 97.2 MG: 32.4 TABLET ORAL at 01:05

## 2024-02-19 RX ADMIN — SODIUM CHLORIDE, POTASSIUM CHLORIDE, SODIUM LACTATE AND CALCIUM CHLORIDE 85 ML/HR: 600; 310; 30; 20 INJECTION, SOLUTION INTRAVENOUS at 18:30

## 2024-02-19 RX ADMIN — PROPOFOL 50 MG: 10 INJECTION, EMULSION INTRAVENOUS at 13:06

## 2024-02-19 RX ADMIN — PHENOBARBITAL 64.8 MG: 32.4 TABLET ORAL at 08:34

## 2024-02-19 RX ADMIN — Medication 3 MG: at 20:00

## 2024-02-19 SDOH — SOCIAL STABILITY: SOCIAL INSECURITY: DO YOU FEEL ANYONE HAS EXPLOITED OR TAKEN ADVANTAGE OF YOU FINANCIALLY OR OF YOUR PERSONAL PROPERTY?: UNABLE TO ASSESS

## 2024-02-19 SDOH — SOCIAL STABILITY: SOCIAL INSECURITY: HAS ANYONE EVER THREATENED TO HURT YOUR FAMILY OR YOUR PETS?: UNABLE TO ASSESS

## 2024-02-19 SDOH — SOCIAL STABILITY: SOCIAL INSECURITY

## 2024-02-19 SDOH — SOCIAL STABILITY: SOCIAL INSECURITY: ARE YOU OR HAVE YOU BEEN THREATENED OR ABUSED PHYSICALLY, EMOTIONALLY, OR SEXUALLY BY ANYONE?: UNABLE TO ASSESS

## 2024-02-19 SDOH — ECONOMIC STABILITY: INCOME INSECURITY
HOW HARD IS IT FOR YOU TO PAY FOR THE VERY BASICS LIKE FOOD, HOUSING, MEDICAL CARE, AND HEATING?: PATIENT UNABLE TO ANSWER

## 2024-02-19 SDOH — SOCIAL STABILITY: SOCIAL INSECURITY: ARE THERE ANY APPARENT SIGNS OF INJURIES/BEHAVIORS THAT COULD BE RELATED TO ABUSE/NEGLECT?: UNABLE TO ASSESS

## 2024-02-19 SDOH — SOCIAL STABILITY: SOCIAL INSECURITY: HAVE YOU HAD THOUGHTS OF HARMING ANYONE ELSE?: UNABLE TO ASSESS

## 2024-02-19 SDOH — ECONOMIC STABILITY: TRANSPORTATION INSECURITY
IN THE PAST 12 MONTHS, HAS LACK OF TRANSPORTATION KEPT YOU FROM MEETINGS, WORK, OR FROM GETTING THINGS NEEDED FOR DAILY LIVING?: PATIENT UNABLE TO ANSWER

## 2024-02-19 SDOH — ECONOMIC STABILITY: HOUSING INSECURITY
IN THE LAST 12 MONTHS, WAS THERE A TIME WHEN YOU DID NOT HAVE A STEADY PLACE TO SLEEP OR SLEPT IN A SHELTER (INCLUDING NOW)?: PATIENT UNABLE TO ANSWER

## 2024-02-19 SDOH — SOCIAL STABILITY: SOCIAL INSECURITY: DOES ANYONE TRY TO KEEP YOU FROM HAVING/CONTACTING OTHER FRIENDS OR DOING THINGS OUTSIDE YOUR HOME?: UNABLE TO ASSESS

## 2024-02-19 SDOH — SOCIAL STABILITY: SOCIAL INSECURITY: DO YOU FEEL UNSAFE GOING BACK TO THE PLACE WHERE YOU ARE LIVING?: UNABLE TO ASSESS

## 2024-02-19 SDOH — ECONOMIC STABILITY: TRANSPORTATION INSECURITY
IN THE PAST 12 MONTHS, HAS THE LACK OF TRANSPORTATION KEPT YOU FROM MEDICAL APPOINTMENTS OR FROM GETTING MEDICATIONS?: PATIENT UNABLE TO ANSWER

## 2024-02-19 SDOH — HEALTH STABILITY: MENTAL HEALTH: CURRENT SMOKER: 0

## 2024-02-19 SDOH — ECONOMIC STABILITY: INCOME INSECURITY
IN THE LAST 12 MONTHS, WAS THERE A TIME WHEN YOU WERE NOT ABLE TO PAY THE MORTGAGE OR RENT ON TIME?: PATIENT UNABLE TO ANSWER

## 2024-02-19 SDOH — SOCIAL STABILITY: SOCIAL INSECURITY: ABUSE: ADULT

## 2024-02-19 ASSESSMENT — ACTIVITIES OF DAILY LIVING (ADL)
HEARING - LEFT EAR: UNABLE TO ASSESS
JUDGMENT_ADEQUATE_SAFELY_COMPLETE_DAILY_ACTIVITIES: UNABLE TO ASSESS
HEARING - RIGHT EAR: UNABLE TO ASSESS
GROOMING: UNABLE TO ASSESS
FEEDING YOURSELF: DEPENDENT
HEARING - LEFT EAR: UNABLE TO ASSESS
LACK_OF_TRANSPORTATION: PATIENT UNABLE TO ANSWER
BATHING: DEPENDENT
TOILETING: DEPENDENT
WALKS IN HOME: DEPENDENT
PATIENT'S MEMORY ADEQUATE TO SAFELY COMPLETE DAILY ACTIVITIES?: UNABLE TO ASSESS
ADEQUATE_TO_COMPLETE_ADL: UNABLE TO ASSESS
WALKS IN HOME: UNABLE TO ASSESS
PATIENT'S MEMORY ADEQUATE TO SAFELY COMPLETE DAILY ACTIVITIES?: UNABLE TO ASSESS
JUDGMENT_ADEQUATE_SAFELY_COMPLETE_DAILY_ACTIVITIES: UNABLE TO ASSESS
BATHING: UNABLE TO ASSESS
DRESSING YOURSELF: DEPENDENT
WALKS IN HOME: DEPENDENT
GROOMING: DEPENDENT
DRESSING YOURSELF: DEPENDENT
FEEDING YOURSELF: UNABLE TO ASSESS
TOILETING: DEPENDENT
ADEQUATE_TO_COMPLETE_ADL: UNABLE TO ASSESS
PATIENT'S MEMORY ADEQUATE TO SAFELY COMPLETE DAILY ACTIVITIES?: UNABLE TO ASSESS
DRESSING YOURSELF: UNABLE TO ASSESS
ADEQUATE_TO_COMPLETE_ADL: UNABLE TO ASSESS
GROOMING: DEPENDENT
TOILETING: UNABLE TO ASSESS
JUDGMENT_ADEQUATE_SAFELY_COMPLETE_DAILY_ACTIVITIES: UNABLE TO ASSESS
HEARING - RIGHT EAR: UNABLE TO ASSESS
FEEDING YOURSELF: DEPENDENT
HEARING - LEFT EAR: UNABLE TO ASSESS
BATHING: DEPENDENT
HEARING - RIGHT EAR: UNABLE TO ASSESS

## 2024-02-19 ASSESSMENT — PAIN SCALES - PAIN ASSESSMENT IN ADVANCED DEMENTIA (PAINAD)
TOTALSCORE: 0
BODYLANGUAGE: RELAXED
CONSOLABILITY: NO NEED TO CONSOLE
BREATHING: NORMAL
FACIALEXPRESSION: SMILING OR INEXPRESSIVE

## 2024-02-19 ASSESSMENT — COGNITIVE AND FUNCTIONAL STATUS - GENERAL
DAILY ACTIVITIY SCORE: 6
DRESSING REGULAR UPPER BODY CLOTHING: TOTAL
HELP NEEDED FOR BATHING: TOTAL
EATING MEALS: TOTAL
WALKING IN HOSPITAL ROOM: TOTAL
MOVING TO AND FROM BED TO CHAIR: TOTAL
TURNING FROM BACK TO SIDE WHILE IN FLAT BAD: TOTAL
MOVING FROM LYING ON BACK TO SITTING ON SIDE OF FLAT BED WITH BEDRAILS: TOTAL
EATING MEALS: TOTAL
DRESSING REGULAR LOWER BODY CLOTHING: TOTAL
HELP NEEDED FOR BATHING: TOTAL
DRESSING REGULAR UPPER BODY CLOTHING: TOTAL
CLIMB 3 TO 5 STEPS WITH RAILING: TOTAL
MOBILITY SCORE: 6
DRESSING REGULAR LOWER BODY CLOTHING: TOTAL
WALKING IN HOSPITAL ROOM: TOTAL
TOILETING: TOTAL
MOBILITY SCORE: 6
STANDING UP FROM CHAIR USING ARMS: TOTAL
DAILY ACTIVITIY SCORE: 6
PERSONAL GROOMING: TOTAL
CLIMB 3 TO 5 STEPS WITH RAILING: TOTAL
STANDING UP FROM CHAIR USING ARMS: TOTAL
TOILETING: TOTAL
PERSONAL GROOMING: TOTAL
MOVING TO AND FROM BED TO CHAIR: TOTAL
TURNING FROM BACK TO SIDE WHILE IN FLAT BAD: TOTAL
MOVING FROM LYING ON BACK TO SITTING ON SIDE OF FLAT BED WITH BEDRAILS: TOTAL
PATIENT BASELINE BEDBOUND: UNABLE TO ASSESS AT THIS TIME

## 2024-02-19 ASSESSMENT — PAIN SCALES - WONG BAKER
WONGBAKER_NUMERICALRESPONSE: NO HURT

## 2024-02-19 ASSESSMENT — LIFESTYLE VARIABLES
HOW OFTEN DO YOU HAVE A DRINK CONTAINING ALCOHOL: NEVER
AUDIT-C TOTAL SCORE: 0
HOW OFTEN DO YOU HAVE A DRINK CONTAINING ALCOHOL: NEVER
SKIP TO QUESTIONS 9-10: 1
SKIP TO QUESTIONS 9-10: 1
HOW MANY STANDARD DRINKS CONTAINING ALCOHOL DO YOU HAVE ON A TYPICAL DAY: PATIENT DOES NOT DRINK
HOW MANY STANDARD DRINKS CONTAINING ALCOHOL DO YOU HAVE ON A TYPICAL DAY: PATIENT DOES NOT DRINK
HOW OFTEN DO YOU HAVE 6 OR MORE DRINKS ON ONE OCCASION: NEVER
AUDIT-C TOTAL SCORE: 0
HOW OFTEN DO YOU HAVE 6 OR MORE DRINKS ON ONE OCCASION: NEVER

## 2024-02-19 ASSESSMENT — PAIN SCALES - GENERAL: PAIN_LEVEL: 0

## 2024-02-19 NOTE — ED PROVIDER NOTES
HPI   Chief Complaint   Patient presents with    Black or Bloody Stool       43-year-old female with a past history of profound intellectual disability, cerebral palsy, spastic quadriplegia, epilepsy, asthma, dysphagia, GERD, neurogenic bladder, left hydronephrosis, scoliosis presents to the emergency department today from her facility for blood in her G-tube.  According to the facility she had a very active day today and then they put her back in the bed and they noticed a small amount of blood in her G-tube and felt as though her abdomen was distended.  Also states that she had a period of increased respirations and were concerned that she may have aspirated.  No history of GI bleeding in the past that we are aware of.  History was obtained from records and patient's nurse practitioner due to nonverbal status.                          Patric Coma Scale Score: 15                  Patient History   No past medical history on file.  Past Surgical History:   Procedure Laterality Date    OTHER SURGICAL HISTORY  07/12/2022    Posterior thoracic vertebral fusion    OTHER SURGICAL HISTORY  07/12/2022    Nissen fundoplication laparoscopic    OTHER SURGICAL HISTORY  07/12/2022    Hip surgery    OTHER SURGICAL HISTORY  07/12/2022    Gastrostomy tube insertion     No family history on file.  Social History     Tobacco Use    Smoking status: Never    Smokeless tobacco: Never   Substance Use Topics    Alcohol use: Never    Drug use: Never       Physical Exam   ED Triage Vitals   Temperature Heart Rate Respirations BP   02/18/24 1804 02/18/24 1804 02/18/24 1850 02/18/24 1804   36.1 °C (97 °F) (!) 106 (!) 23 123/76      Pulse Ox Temp src Heart Rate Source Patient Position   02/18/24 1804 -- 02/18/24 2000 --   95 %  Monitor       BP Location FiO2 (%)     -- --             Physical Exam  Vitals and nursing note reviewed.   Constitutional:       General: She is not in acute distress.     Appearance: Normal appearance. She is not  toxic-appearing.   HENT:      Right Ear: Tympanic membrane normal.      Left Ear: Tympanic membrane normal.      Mouth/Throat:      Mouth: Mucous membranes are moist.      Pharynx: Oropharynx is clear.   Eyes:      Extraocular Movements: Extraocular movements intact.      Pupils: Pupils are equal, round, and reactive to light.   Cardiovascular:      Rate and Rhythm: Normal rate and regular rhythm.      Pulses: Normal pulses.      Heart sounds: Murmur heard.   Pulmonary:      Effort: Pulmonary effort is normal.      Breath sounds: Normal breath sounds.   Abdominal:      General: Abdomen is flat. Bowel sounds are normal. There is distension.      Palpations: Abdomen is soft.      Tenderness: There is no abdominal tenderness.      Comments: + blood in G tube   Musculoskeletal:      Cervical back: Normal range of motion and neck supple.      Comments: contractures   Skin:     General: Skin is warm and dry.      Capillary Refill: Capillary refill takes less than 2 seconds.   Neurological:      General: No focal deficit present.      Mental Status: She is alert. Mental status is at baseline.         Labs Reviewed   CBC WITH AUTO DIFFERENTIAL - Abnormal       Result Value    WBC 17.2 (*)     nRBC 0.0      RBC 3.11 (*)     Hemoglobin 10.3 (*)     Hematocrit 31.0 (*)           MCH 33.1      MCHC 33.2      RDW 12.9      Platelets 331      Neutrophils % 80.7      Immature Granulocytes %, Automated 0.8      Lymphocytes % 10.7      Monocytes % 7.7      Eosinophils % 0.0      Basophils % 0.1      Neutrophils Absolute 13.89 (*)     Immature Granulocytes Absolute, Automated 0.14      Lymphocytes Absolute 1.84      Monocytes Absolute 1.32 (*)     Eosinophils Absolute 0.00      Basophils Absolute 0.02     BASIC METABOLIC PANEL - Abnormal    Glucose 149 (*)     Sodium 137      Potassium 3.8      Chloride 103      Bicarbonate 28      Anion Gap 10      Urea Nitrogen 31 (*)     Creatinine 0.33 (*)     eGFR >90      Calcium 8.1 (*)     HEPATIC FUNCTION PANEL - Abnormal    Albumin 3.4      Bilirubin, Total 0.2      Bilirubin, Direct 0.1      Alkaline Phosphatase 161 (*)     ALT 80 (*)     AST 61 (*)     Total Protein 6.5     LIPASE - Normal    Lipase 31      Narrative:     Venipuncture immediately after or during the administration of Metamizole may lead to falsely low results. Testing should be performed immediately prior to Metamizole dosing.   LACTATE - Normal    Lactate 1.7      Narrative:     Venipuncture immediately after or during the administration of Metamizole may lead to falsely low results. Testing should be performed immediately  prior to Metamizole dosing.   TYPE AND SCREEN    ABO TYPE A      Rh TYPE POS      ANTIBODY SCREEN NEG       Pain Management Panel           No data to display              XR chest 1 view   Final Result   No evidence of aspiration.   New hardware fracture involving the right sided spinal fusion chad and   unchanged fracture involving the left spinal fusion chad at the   similar level.        MACRO:   None.        Signed by: Carlos Rodgers 2/18/2024 7:40 PM   Dictation workstation:   [id number]      CT angio abdomen pelvis w and or wo IV IV contrast    (Results Pending)       ED Course & MDM   Diagnoses as of 02/18/24 2311   Gastrointestinal hemorrhage, unspecified gastrointestinal hemorrhage type   Anemia, unspecified type       Medical Decision Making  On initial evaluation patient is well-appearing the emergency department at this time.  She is found to be tachycardic and was given a 500 cc bolus initially.  She is nonverbal therefore most information was obtained from her NP provider at [facility] [place] as well as paperwork that she was sent with.  She does have some slight distention in the abdomen as well as blood in her G-tube there is no blood in the bag at this time.  Labs were obtained for the patient show a lactic of 1.7 lipase of 31 alk phos ALT and AST are slightly elevated which has occurred previously  glucose of 149 BUN of 31 creatinine 2.33.  She does have a leukocytosis with white count of 17.2 and an anemia with a hemoglobin 10.3.  Most recent hemoglobin from a month ago was 15.  She generally lives in the hemoglobin of 15.  CT angio of the abdomen and pelvis shows hiatal hernia postsurgical changes picked him that is inflated in the correct area with no evidence of extravasation contrast or active GI bleed does have a large amount of stool throughout the colon which the NP states she did have a good bowel movement this morning with no melena or bleeding.  Chest x-ray shows no evidence of aspiration.  I gave patient an additional 500 cc bolus due to her tachycardia I also added on her phenobarbital and baclofen as she missed her second dose of baclofen and has not received her nighttime dose.  Spoke with internal medicine about admission after I got the okay from patient's NP and they are agreeable to plan.  Patient will be admitted to the stepdown floor.  Patient's nurse practitioner Madhavi [phone].  I asked if she wanted me to call her parents and she states that she will notify them tomorrow morning.        Procedure  Procedures       Janna Schafer, BAUDILIO-EMELINA  02/18/24 4932

## 2024-02-19 NOTE — PROGRESS NOTES
How Severe Are Your Spot(S)?: moderate Physical Therapy                 Therapy Communication Note    Patient Name: Ruma Manzanares  MRN: [id number]  Today's Date: 2/19/2024     Discipline: Physical Therapy    Missed Visit Reason: Missed Visit Reason:  (SEEN FOR SCREEN/DC, PT. W/ LIMB CONTRACTURES, DEPENDANT W/ ALL CARE, NOT ABLE TO FOLLOW DIRECTIONS, WOULD BENEFIT FROM PROM AND PROPER POSITIONING FROM NURSING STAFF, SHE HAS NO SKILLED NEEDS, DISCH FROM THERAPY)    Missed Time:     Comment:   What Type Of Note Output Would You Prefer (Optional)?: Standard Output What Is The Reason For Today's Visit?: Full Body Skin Examination What Is The Reason For Today's Visit? (Being Monitored For X): the development of new lesions

## 2024-02-19 NOTE — H&P (VIEW-ONLY)
Reason For Consult  Acute gastrointestinal bleeding with norm blood drainage via the PEG tube.    History Of Present Illness  Ruma Manzanares is a 43 y.o. female presenting from the nursing home with complaints of acute onset bleeding via the gastrostomy feeding tube.  The patient has profound intellectual disability with history of cerebral palsy and is unable to give additional history.  It is unclear whether she has noted abdominal pain.  However on presentation she was noted to be draining norm blood from her gastrostomy tube.  Her medical history is notable for spastic quadriplegia with cerebral palsy seizure disorder, neurogenic bladder, GERD, and recurrent urinary tract infections.,  Review of her admission records showed that her hemoglobin on 1/12/2024 was 15 g%.  On presentation here was initially 10.3 g% subsequently dropping to 7.5 g%.  She was also reported to have developed melena stool.     Past Medical History  She has no past medical history on file.    Surgical History  She has a past surgical history that includes Other surgical history (07/12/2022); Other surgical history (07/12/2022); Other surgical history (07/12/2022); and Other surgical history (07/12/2022).     Social History  She reports that she has never smoked. She has never used smokeless tobacco. She reports that she does not drink alcohol and does not use drugs.    Family History  No family history on file.     Allergies  Patient has no known allergies.    Review of Systems  Unable to document a review of systems.  The patient is nonverbal and noncommunicative.  Physical Exam  Head and neck examinations were  unremarkable. There was no temporal wasting. No jaundice noted. No thyromegaly.  No carotid bruits.  She has generalized flexion contractures.  She did not respond to questions or commands.  There is no peripheral lymphadenopathy.  Lungs were clear to auscultation. There when no rales, rhonchi or wheezes.  Cardiac examination  revealed normal heart sounds. Rhythm was sinus . There were no murmurs.  Abdomen was full and soft. There was no organomegaly. Bowel sounds were normo- active. There was no ascites. The abdomen was nontender.  Rectal examination was not done.  A gastrostomy tube was noted in the left upper quadrant of the anterior abdominal wall.  Extremities: She had flexion contraction deformities of extremities.  Neurological examination:   Patient was nonverbal and noncommunicative.  She has severe cognitive neurological deficits.     Last Recorded Vitals  Blood pressure 99/66, pulse 60, temperature 37.2 °C (98.9 °F), temperature source Temporal, resp. rate 19, height 1.219 m (4'), weight (!) 42.6 kg (94 lb), SpO2 100 %.    Relevant Results      None     Assessment/Plan     43-year-old lady with history of intellectual disability, seizure disorder and cerebral palsy.  She has a gastrostomy tube in situ for long-term nutritional management.  She was noted over the past 24 to 48 hours to be draining norm blood per the gastrostomy tube with a drop of hemoglobin down to 7.3 g%.    B) an emergency upper GI endoscopy was just completed by me this afternoon.  A large amount of old blood was noted in the gastric fundus and cardia.  Most notably a large circular ulcer crater was noted in the high fundus.  There was no active bleeding from the ulcer.  I also did not document any visible vessel or adherent clot to the ulcer base.  Mucosa of the esophagus and duodenum were normal.    A PEG tube was noted in situ along the anterior gastric wall.    Recommendations:  Continue pantoprazole 40 mg IV every 12 hours for the next 3 days.    And then may reduce the dose to 40 mg IV daily.    Repeat upper GI endoscopy examination in the next 2 to 3 days, at which time effort should be made to biopsy the edges of the gastric ulcer.    She will need repeat endoscopy in 3 months following long-term ulcer treatment.    Transfuse packed red blood cells  as needed to maintain hemoglobin over 8 g%.    I spent 45 minutes in the professional and overall care of this patient.      Efren Mccallum MD

## 2024-02-19 NOTE — ANESTHESIA PREPROCEDURE EVALUATION
Patient: Ruma Manzanares    Procedure Information       Date/Time: 02/19/24 1300    Procedure: EGD    Location: [facility] OR            Relevant Problems   Anesthesia (within normal limits)      Cardiovascular (within normal limits)      Endocrine (within normal limits)      GI   (+) GERD (gastroesophageal reflux disease)   (+) Gastrointestinal hemorrhage, unspecified gastrointestinal hemorrhage type      /Renal   (+) Recurrent UTI      Neuro/Psych   (+) Epilepsy (CMS/HCC)      Pulmonary   (+) Asthma      GI/Hepatic (within normal limits)      Hematology   (+) Acute blood loss anemia      Musculoskeletal   (+) Scoliosis      Eyes, Ears, Nose, and Throat (within normal limits)      Infectious Disease   (+) Recurrent UTI       Clinical information reviewed:   Tobacco  Allergies  Meds   Med Hx  Surg Hx   Fam Hx  Soc Hx        NPO Detail:  No data recorded     Physical Exam    Airway  Mallampati: unable to assess     Cardiovascular - normal exam     Dental    Pulmonary - normal exam     Abdominal - normal exam           Anesthesia Plan    History of general anesthesia?: yes  History of complications of general anesthesia?: no    ASA 3     MAC     The patient is not a current smoker.    intravenous induction   Postoperative administration of opioids is intended.  Anesthetic plan and risks discussed with patient.  Use of blood products discussed with patient who.    Plan discussed with CRNA.

## 2024-02-19 NOTE — CONSULTS
Nutrition Initial Assessment:   Nutrition Assessment    Reason for Assessment: Tube feeding assessment and order    Medical history per chart:     43 y.o. female with PMHx s/f cerebral palsy with spastic quadriplegia, seizure disorder, profound intellectual disability, baseline nonverbal status, dysphagia s/p G-tube, neurogenic bladder (requires CIC at her facility) with recurrent UTIs, asthma (no baseline O2), GERD, scoliosis, history of posterior thoracic vertebral fusion (1988), who presents from her care facility for evaluation of blood in G-tube, abdominal distention, and increased WOB.      2/19:  Pt non-verbal, and hx of Peg on TF.  Pt from [facility] and noted previous TF orders.  Pt is s/p Upper GI endoscopy for bleeding out of Peg.  Per GI - Dr. Mccallum - TF might be able to start tomorrow.  Will follow.     Nutrition History:  Food and Nutrient History: Diet per [facility] noted: 900 ml Nutren with fiber plus 2 scoops Beneprotein + 1 tsp salt + free water to equal 1750 ml, then divided into 5 feeds of 350 ml       Current Diet: NPO Diet; Effective now       Nutrition Related Findings:   BM: Last BM Date: 02/19/24  Wound Type: none (nursing/wound notes provide further details)    Food allergies: NKFA. has No Known Allergies.  Meds/Labs reviewed.  baclofen, 15 mg, oral, Once  baclofen, 15 mg, g-tube, TID  cefTRIAXone, 1 g, intravenous, q24h  melatonin, 3 mg, oral, Daily  mometasone, 1 puff, inhalation, BID  pantoprazole, 40 mg, intravenous, BID  PHENobarbitaL, 64.8 mg, oral, Once  PHENobarbitaL, 64.8 mg, oral, q AM  PHENobarbitaL, 97.2 mg, oral, Nightly  polyethylene glycol, 17 g, oral, Daily       lactated Ringer's, 100 mL/hr, Last Rate: 100 mL/hr (02/19/24 1302)         Nutrition Significant Labs:    Results from last 7 days   Lab Units 02/18/24  1908   GLUCOSE mg/dL 149*   SODIUM mmol/L 137   POTASSIUM mmol/L 3.8   CHLORIDE mmol/L 103   CO2 mmol/L 28   BUN mg/dL 31*   CREATININE mg/dL 0.33*   EGFR mL/min/1.73m*2  ">90   CALCIUM mg/dL 8.1*     No results found for: \"HGBA1C\"        Anthropometrics:  Height: 121.9 cm (4')   Weight: (!) 42.6 kg (94 lb)   BMI (Calculated): 28.69  IBW/kg (Dietitian Calculated): 41 kg          Weight History:   Wt Readings from Last 10 Encounters:   02/19/24 (!) 42.6 kg (94 lb)        Weight Change %:  Weight History / % Weight Change: Per [facility] records UBW is 45 kg (99#)  Significant Weight Loss: No          Nutrition Focused Physical Exam Findings:  defer: Unable to participate  Subcutaneous Fat Loss:      Muscle Wasting:     Edema:     Physical Findings:  Skin: Negative    Estimated Needs:      Method for Estimating Needs: 9585-0462 kcals (25-30 kcal/kg)     Method for Estimating Needs: 43-52 gm (1-1.2 gm/kg)     Method for Estimating Needs: 1ml/kcal        Nutrition Diagnosis   Nutrition Diagnosis:  Malnutrition Diagnosis  Patient has Malnutrition Diagnosis: No    Nutrition Diagnosis  Patient has Nutrition Diagnosis: Yes  Diagnosis Status (1): New  Nutrition Diagnosis 1: Inadequate oral intake  Related to (1): GI function, bleeding from Peg tube  As Evidenced by (1): NPO status with need of Enteral nutrition       Nutrition Interventions/Recommendations   Nutrition Interventions and Recommendations:        Nutrition Prescription:  Individualized Nutrition Prescription Provided for : TF of Nutren with Fiber @ goal rate of 45 ml/hr: 1080 kcals, 43 gm protein, 905 ml free water        Nutrition Interventions:   Food and/or Nutrient Delivery Interventions  Interventions: Enteral intake  Enteral Intake: Other (Comment)  Goal: Free water flush of 100 ml Q6H to provide with TF: 1305 ml free water    Coordination of Nutrition Care by a Nutrition Professional  Collaboration and Referral of Nutrition Care: Collaboration by nutrition professional with other providers  Goal: Reviewed with RN, and Dr. Mccallum    Nutrition Education:   Education Documentation  No documentation found.      Nutrition " Counseling  Counseling Theoretical Approach: Other (Comment)  Goal: pt unable       Nutrition Monitoring and Evaluation   Monitoring/Evaluation:   Food/Nutrient Related History Monitoring  Monitoring and Evaluation Plan: Enteral and parenteral nutrition intake  Enteral and Parenteral Nutrition Intake: Enteral nutrition formula/solution, Enteral nutrition intake  Criteria: TF initation, advancement, and tolerance  Additional Plans: Monitor labs    Body Composition/Growth/Weight History  Monitoring and Evaluation Plan: Weight  Weight: Measured weight  Criteria: stable weight    Biochemical Data, Medical Tests and Procedures  Monitoring and Evaluation Plan: Electrolyte/renal panel, Glucose/endocrine profile, GI profile  Electrolyte and Renal Panel: BUN, Calcium, serum, Creatinine, Phosphorus, Potassium, Sodium  Criteria: WNL  Gastrointestinal Profile: Other (Comment)  Criteria: GI Function  Glucose/Endocrine Profile: Glucose, casual, Glucose, fasting  Criteria: WNL    Nutrition Focused Physical Findings  Monitoring and Evaluation Plan: Skin  Skin: Other (Comment)  Criteria: Intact skin            Time Spent/Follow-up Reminder:   Follow Up  Time Spent (min): 45 minutes  Last Date of Nutrition Visit: 02/19/24  Nutrition Follow-Up Needed?: Dietitian to reassess per policy  Follow up Comment: 2/21-2/22

## 2024-02-19 NOTE — PROGRESS NOTES
2/19/24 1542   02/19/24 1542   Discharge Planning   Living Arrangements Alone   Support Systems Other (Comment)   Assistance Needed total   Type of Residence care home   Care [facility] Name [facility]   Patient expects to be discharged to: [facility]   Does the patient need discharge transport arranged? Yes   RoundTrip coordination needed? Yes     Attempt to call [facility] where pt resides for baseline information. No answer. Left a [facility] with call back information.   Monica Trevizo RN, BSN, Federico/ John DELAROSA Supervisor

## 2024-02-19 NOTE — H&P
History Of Present Illness  Ruma Manzanares is a 43 y.o. female presenting with bleeding via her PEG tube.  Bright red blood was noted exiting from patient's PEG tube at the nursing home and she has had a 3 g hemoglobin drop over the past 24 to 48 hours.  Patient is nonverbal and does not respond to questions or commands..     Past Medical History  She has no past medical history on file.    Surgical History  She has a past surgical history that includes Other surgical history (07/12/2022); Other surgical history (07/12/2022); Other surgical history (07/12/2022); and Other surgical history (07/12/2022).     Social History  She reports that she has never smoked. She has never used smokeless tobacco. She reports that she does not drink alcohol and does not use drugs.    Family History  No family history on file.     Allergies  Patient has no known allergies.    Review of Systems  Constitutional: no fever, no chills, fatigue,  not feeling tired and no recent weight loss. No reports of dizziness.  SKIN: No skin rash or lesions  EYE: No pain photophobia, diplopia or blurred vision  EAR: No discharge, pain or hearing loss  NOSE: No congestion, epistaxis or obstruction  RESPIRATORY: No cough , dyspnea or  chest pain   CV: No chest pain, lower extremity swelling or palpitations  GE: No abdominal pain, nausea, vomiting, dysphagia or odynophagia. Denied constipation , diarrhea  : No dysuria or hematuria, urinary incontinence or urine frequency  NEURO: Denied weakness, confusion, dizziness, or numbness   PSYCH : Denies anxiety, hallucinations or insomnia  HEME/ LYMPH : Denied bleeding , bruising or enlarged nodes  ENDOCRINE: No change in weight, polydipsia, or abnormal bleeding  .        Physical Exam  Head and neck examinations were  unremarkable. There was no temporal wasting. No jaundice noted. No thyromegaly.  No carotid bruits.  There is no peripheral lymphadenopathy.  Lungs were clear to auscultation. There when no  rales, rhonchi or wheezes.  Cardiac examination revealed normal heart sounds. Rhythm was sinus . There were no murmurs.  Abdomen was full and soft. There was no organomegaly. Bowel sounds were normo- active. There was no ascites. The abdomen was nontender.  Rectal examination was not done.  Extremities: No edema or joint swelling.  Neurological examination:   Patient  has severe neurologic deficit.  She does not respond to questions or commands.  She is unable to sign the consent.  She has severe neurologic deficits and is not aware of her surroundings.  Last Recorded Vitals  Blood pressure 101/65, pulse 86, temperature 36.9 °C (98.5 °F), temperature source Tympanic, resp. rate 19, height 1.219 m (4'), weight (!) 42.6 kg (94 lb), SpO2 91 %.    Relevant Results        None     Assessment/Plan  43-year-old nursing home resident with a gastrostomy tube in place for nutritional management.  She has had bleeding via the PEG tube over the past 12 to 24 hours.  Hemoglobin has dropped about 3 g over the same time period .   The patient is down here in surgery for emergency endoscopy to determine the source of her bleeding and for treatment.  Efforts to reach her mother for consent were unsuccessful.  However Dr. Romero was contacted and gave his consent that two doctors may sign for the procedure to proceed if deemed clinically emergent.  There is fresh blood suctioning out of the gastrostomy feeding tube.  The anesthesiologist Dr. Montilla and I have therefore signed for the procedure to proceed on grounds of medical necessity.    Will proceed with upper GI endoscopy.    Efren Mccallum MD

## 2024-02-19 NOTE — H&P
History Of Present Illness:  Ruma Manzanares is a 43 y.o. female with PMHx s/f cerebral palsy with spastic quadriplegia, seizure disorder, profound intellectual disability, baseline nonverbal status, dysphagia s/p G-tube, neurogenic bladder (requires CIC at her facility) with recurrent UTIs, asthma (no baseline O2), GERD, scoliosis, history of posterior thoracic vertebral fusion (1988), who presents from her care facility for evaluation of blood in G-tube, abdominal distention, and increased WOB. Patient is nonverbal and unable to provide history, so collateral is obtained from chart review, discussion with ED provider (who was able to speak with the patient's primary NP from the facility for additional information). In short, the pt came in after having a fairly active day. When caregivers were assisting the patient back into bed this evening, they noticed blood coming out of her G-tube (no prior noted history of GI bleeding). They also noted her abdomen looked distended, and that she seemed increasingly SOB compared to her baseline. She was sent into the ED for further evaluation.     ED Course (Summary):   Vitals on presentation: T97.0, /76, , RR 23, SpO2 94% RA  CBC: WBC 17.2 (neutrophil predominance on differential), Hgb 10.3 (from 15.0 on 01/12/24), platelets 331  CMP: Glucose 149, sodium 137, potassium 3.8, BUN 31, serum creatinine 0.33  Lactate 1.7  Imaging: CXR without acute cardiopulmonary process; does note new fracture of hardware of the spinal fusion on the R and prior known fracture of the chad on the L. CTA Abd/Pelvis with no acute GI bleed, large amount of stool, bladder wall thickening c/f cystitis, and a new incompletely imaged paraesophageal hernia compared to prior imaging   Interventions: 1L NS    Patient's NP: Madhavi [phone] --> she is on call overnight tonight (02/18/24) and will be in to see the patient tomorrow (02/19/24). She mentions if anything changes or anyone has  questions they are more than welcome to call her. She also gave the number [phone] to contact the facility for the patient's med list (I tried this but it went to voicemail x2).     ED Course:  Diagnoses as of 02/18/24 2305   Gastrointestinal hemorrhage, unspecified gastrointestinal hemorrhage type   Anemia, unspecified type     Relevant Results  Results for orders placed or performed during the hospital encounter of 02/18/24 (from the past 24 hour(s))   CBC and Auto Differential   Result Value Ref Range    WBC 17.2 (H) 4.4 - 11.3 x10*3/uL    nRBC 0.0 0.0 - 0.0 /100 WBCs    RBC 3.11 (L) 4.00 - 5.20 x10*6/uL    Hemoglobin 10.3 (L) 12.0 - 16.0 g/dL    Hematocrit 31.0 (L) 36.0 - 46.0 %     80 - 100 fL    MCH 33.1 26.0 - 34.0 pg    MCHC 33.2 32.0 - 36.0 g/dL    RDW 12.9 11.5 - 14.5 %    Platelets 331 150 - 450 x10*3/uL    Neutrophils % 80.7 40.0 - 80.0 %    Immature Granulocytes %, Automated 0.8 0.0 - 0.9 %    Lymphocytes % 10.7 13.0 - 44.0 %    Monocytes % 7.7 2.0 - 10.0 %    Eosinophils % 0.0 0.0 - 6.0 %    Basophils % 0.1 0.0 - 2.0 %    Neutrophils Absolute 13.89 (H) 1.20 - 7.70 x10*3/uL    Immature Granulocytes Absolute, Automated 0.14 0.00 - 0.70 x10*3/uL    Lymphocytes Absolute 1.84 1.20 - 4.80 x10*3/uL    Monocytes Absolute 1.32 (H) 0.10 - 1.00 x10*3/uL    Eosinophils Absolute 0.00 0.00 - 0.70 x10*3/uL    Basophils Absolute 0.02 0.00 - 0.10 x10*3/uL   Basic metabolic panel   Result Value Ref Range    Glucose 149 (H) 74 - 99 mg/dL    Sodium 137 136 - 145 mmol/L    Potassium 3.8 3.5 - 5.3 mmol/L    Chloride 103 98 - 107 mmol/L    Bicarbonate 28 21 - 32 mmol/L    Anion Gap 10 10 - 20 mmol/L    Urea Nitrogen 31 (H) 6 - 23 mg/dL    Creatinine 0.33 (L) 0.50 - 1.05 mg/dL    eGFR >90 >60 mL/min/1.73m*2    Calcium 8.1 (L) 8.6 - 10.3 mg/dL   Lipase   Result Value Ref Range    Lipase 31 9 - 82 U/L   Hepatic function panel   Result Value Ref Range    Albumin 3.4 3.4 - 5.0 g/dL    Bilirubin, Total 0.2 0.0 - 1.2  mg/dL    Bilirubin, Direct 0.1 0.0 - 0.3 mg/dL    Alkaline Phosphatase 161 (H) 33 - 110 U/L    ALT 80 (H) 7 - 45 U/L    AST 61 (H) 9 - 39 U/L    Total Protein 6.5 6.4 - 8.2 g/dL   Lactate   Result Value Ref Range    Lactate 1.7 0.4 - 2.0 mmol/L   Type and Screen   Result Value Ref Range    ABO TYPE A     Rh TYPE POS     ANTIBODY SCREEN NEG       CT angio abdomen pelvis w and or wo IV IV contrast    Result Date: 2/18/2024  Interpreted By:  Mahendra Sethi, STUDY: CT ANGIO ABDOMEN PELVIS W AND/OR WO IV IV CONTRAST;  2/18/2024 7:58 pm   INDICATION: Signs/Symptoms:abdominal distention; blood from G tube. History of Nissen fundoplication.   COMPARISON: 11/7/2006   ACCESSION NUMBER(S): [id number]   ORDERING CLINICIAN: YANNI BOYER   TECHNIQUE: Contiguous axial images of the abdomen and pelvis were obtained with and without intravenous contrast. And sagittal reformatted images were obtained from the axial images. MIPS and 3D reformatted images were also performed and reviewed.   FINDINGS: There is limited evaluation of the lung bases. Bilateral subsegmental atelectasis. No pleural effusion.   Evaluation the abdomen pelvis is limited secondary to patient motion and beam hardening artifact secondary to extensive thoracolumbar fusion hardware and also inferior position of the patient's arms.   No evidence of liver mass. The gallbladder is present. No dilatation of common bile duct.   The pancreas, spleen, and adrenal glands appear unremarkable.   Limited evaluation of the kidneys secondary to beam hardening artifact. A subcentimeter hypodensity in the left kidney is too small to characterize. No hydronephrosis.   There is postsurgical change of Nissen fundoplication. There is new paraesophageal hiatal hernia with herniation of segment of the stomach extends superiorly along the course of the esophagus, and incompletely imaged superiorly. A PEG tube is noted. There heterogeneity content in the stomach which may correspond  to ingested content. No evidence of bowel obstruction. There is large amount of stool throughout the colon. No definite evidence of exaggeration of contrast in the bowel.   No evidence of abdominal aortic aneurysm or dissection. The origins of the celiac artery, superior mesenteric artery, bilateral renal arteries, and the inferior mesenteric artery are patent.   Urinary bladder is underdistended and not well evaluated. There is however evidence of prominent urinary bladder wall thickening.   Limited evaluation of the uterus and adnexa.   Scoliotic curvature and posterior of extensive thoracolumbar fusion. There is also associated fusion of bilateral sacroiliac joints. There is bilateral chronic hip dysplasia.       Postsurgical change of Nissen fundoplication. There is new paraesophageal hiatal hernia which is incompletely imaged superiorly. PEG tube is noted with balloon satisfactorily inflated in the stomach. No evidence of extravasation contrast to suggest acute active gastrointestinal bleed.   Large amount of stool throughout the colon; please correlate for constipation.   Urinary bladder wall thickening; please correlate with urinalysis for cystitis.   MACRO: None   Signed by: Mahendra Sethi 2/18/2024 9:00 PM Dictation workstation:   [id number]    XR chest 1 view    Result Date: 2/18/2024  Interpreted By:  Carlos Rodgers, STUDY: XR CHEST 1 VIEW;  2/18/2024 7:25 pm   INDICATION: Signs/Symptoms:facility concern for aspiration.   COMPARISON: 09/19/2016   ACCESSION NUMBER(S): [id number]   ORDERING CLINICIAN: YANNI BOYER   FINDINGS: Patient rotation limits evaluation. There is no evidence for focal consolidation. The cardiomediastinal silhouette is prominent and stable.   Redemonstration of long segment thoracic spinal fusion rods with multilevel cerclage wire reinforcement. There is discontinuity of the rods in the lower thoracic/lumbosacral region, 1 of which was present prior study in the other  appearing new from prior study.       No evidence of aspiration. New hardware fracture involving the right sided spinal fusion chad and unchanged fracture involving the left spinal fusion chad at the similar level.   MACRO: None.   Signed by: Carlos Rodgers 2/18/2024 7:40 PM Dictation workstation:   [id number]     Scheduled medications:  baclofen, 15 mg, oral, Once  melatonin, 3 mg, oral, Daily  pantoprazole, 40 mg, intravenous, BID  PHENobarbitaL, 64.8 mg, oral, Once  [START ON 2/19/2024] polyethylene glycol, 17 g, oral, Daily  sodium chloride, 500 mL, intravenous, Once  sodium phosphates, 1 enema, rectal, Once      Continuous medications:  lactated Ringer's, 75 mL/hr      PRN medications:  PRN medications: bisacodyl, bisacodyl, guaiFENesin, ondansetron **OR** ondansetron      Past Medical History  She has no past medical history on file.    Surgical History  She has a past surgical history that includes Other surgical history (07/12/2022); Other surgical history (07/12/2022); Other surgical history (07/12/2022); and Other surgical history (07/12/2022).     Social History  She reports that she has never smoked. She has never used smokeless tobacco. She reports that she does not drink alcohol and does not use drugs.    Family History  No family history on file.     Allergies  Patient has no known allergies.    Code Status  Full Code     Review of Systems   Unable to perform ROS: Patient nonverbal       Last Recorded Vitals  /77   Pulse (!) 120   Temp 36.1 °C (97 °F)   Resp 17   Wt (!) 43.6 kg (96 lb 1.9 oz)   SpO2 94%      Physical Exam  Vitals and nursing note reviewed.   Constitutional:       General: She is awake. She is not in acute distress.     Appearance: She is well-developed. She is ill-appearing (but not overtly toxic). She is not toxic-appearing.   HENT:      Head: Normocephalic and atraumatic.      Right Ear: External ear normal.      Left Ear: External ear normal.      Nose: Nose normal.       Mouth/Throat:      Mouth: Mucous membranes are dry.   Eyes:      General: No scleral icterus.     Extraocular Movements: Extraocular movements intact.      Pupils: Pupils are equal, round, and reactive to light.   Cardiovascular:      Rate and Rhythm: Regular rhythm. Tachycardia present.      Pulses: Normal pulses.      Heart sounds: Murmur heard.      No friction rub. No gallop.   Pulmonary:      Effort: Pulmonary effort is normal. No respiratory distress.      Breath sounds: No wheezing, rhonchi or rales.   Abdominal:      General: Bowel sounds are normal. There is distension.      Palpations: Abdomen is soft. There is no hepatomegaly, splenomegaly or mass.      Tenderness: There is no abdominal tenderness.      Comments: PEG in place - scant amount of dried blood evident in tubing   Musculoskeletal:         General: No deformity or signs of injury.      Cervical back: Normal range of motion and neck supple. No rigidity or tenderness.      Right lower leg: No edema.      Left lower leg: No edema.      Comments: Chronic contractures to all extremities    Skin:     General: Skin is warm and dry.      Findings: No erythema, lesion or rash.   Neurological:      General: No focal deficit present.      Mental Status: Mental status is at baseline.      Comments: Not really tracking with eyes (but did seem to follow sound / noise around the room), not following commands. Difficult to adequately assess. Moving head/neck side to side without difficulty.    Psychiatric:      Comments: Unable to adequately assess       Assessment/Plan   Principal Problem:    Gastrointestinal hemorrhage, unspecified gastrointestinal hemorrhage type  Active Problems:    Asthma    Cerebral palsy (CMS/HCC)    Dysphagia    Epilepsy (CMS/HCC)    Flexion contractures    GERD (gastroesophageal reflux disease)    Profound intellectual disability    Scoliosis    Spastic quadriparesis secondary to cerebral palsy (CMS/HCC)    Recurrent UTI     Neurogenic bladder    Acute blood loss anemia    Other constipation    43 y.o. female with PMHx s/f cerebral palsy with spastic quadriplegia, seizure disorder, profound intellectual disability, baseline nonverbal status, dysphagia s/p G-tube, neurogenic bladder (requires CIC at her facility) with recurrent UTIs, asthma (no baseline O2), GERD, scoliosis, history of posterior thoracic vertebral fusion (1988), who presents from her care facility for evaluation of blood in G-tube, abdominal distention, and increased WOB.    ABLA suspected 2/2 UGIB   -Patient presenting with evidence of bleeding in her G tube from facility  -No active extrav on CTA; however, elevated BUN (31) compared to baseline with Hgb 15 (about a month ago) --> 10.3 today  -Lactate OK at 1.7  -BP normotensive  -Continue to trend H&H  -Continue PPI BID   -Check basic anemia labs (will hold off ferritin because this was 24 within the last month)  -Gentle fluids overnight  -GI consultation appreciated    Constipation / large stool burden, abdominal distention   -1x enema ordered  -Continued on bowel regimen   -Follow abdominal exam   -GI consult appreciated    Leukocytosis   -Presenting with elevated WBCs at 17.2. Lactate 1.7   -Negative CXR for acute process. No obvious infectious etiology on abdominal imaging.   -History of recurrent UTIs with bladder wall thickening on CT --> ordered UA   -Ordered blood cultures x2 out of abundance of caution   -Continue to trend fever curve, WBCs  -(?) just reactionary to above     New fracture of right-sided thoracic spinal hardware; known L sided thoracic spinal hardware fracture   -Surgery was completed in 1988 per records   -Suspect this can likely be followed up in the outpatient setting     Cerebral palsy with spastic quadriplegia  -Confirm and resume home medications  -Resident of [facility]'s     Seizure disorder   -Patient was given home dose of phenobarb in the ED x1  -Working on confirming the remainder of  home meds / updating home med list  -I reviewed the patient's outpatient notes and it appears she will typically get generalized tonic-clonic seizures 4-6x a year     Profound intellectual disability, baseline nonverbal status  -Resident of [facility] since age 13    Dysphagia s/p G-tube  -NPO  -Nutrition consultation appreciated moving forward    Neurogenic bladder (requires CIC at her facility) with recurrent UTIs  -Checking UA  -Can continue with straight cath / bladder scans    Asthma without acute exacerbation   -Confirm and resume home therapies          Francia Jimenez PA-C    Dragon dictation software was used to dictate this note and thus there may be minor errors in translation/transcription including garbled speech or misspellings. Please contact for clarification if needed.

## 2024-02-19 NOTE — PROGRESS NOTES
Occupational Therapy                 Therapy Communication Note    Patient Name: Ruma Manzanares  MRN: [id number]  Today's Date: 2/19/2024     Discipline: Occupational Therapy    Missed Visit Reason: Other (Comment) (Pt. has bilat. UE contractures,needing frequent PROM maintenance. Pt. dep. for all care, repositioning, and trfrs. No O.T. recommended.  Discharge O.T.)

## 2024-02-19 NOTE — CARE PLAN
The patient's goals for the shift include      The clinical goals for the shift include stable h/h

## 2024-02-19 NOTE — ANESTHESIA POSTPROCEDURE EVALUATION
Patient: Ruma Manzanares    Procedure Summary       Date: 02/19/24 Room / Location: [facility] OR    Anesthesia Start: 1302 Anesthesia Stop: 1333    Procedure: EGD Diagnosis: Gastrointestinal hemorrhage, unspecified gastrointestinal hemorrhage type    Scheduled Providers:  Responsible Provider: JAMES Simmons    Anesthesia Type: MAC ASA Status: 3            Anesthesia Type: MAC    Vitals Value Taken Time   BP 99/66 02/19/24 1441   Temp 37.2 °C (98.9 °F) 02/19/24 1420   Pulse 62 02/19/24 1447   Resp 19 02/19/24 1448   SpO2 99 % 02/19/24 1448   Vitals shown include unvalidated device data.    Anesthesia Post Evaluation    Patient location during evaluation: PACU  Patient participation: complete - patient participated  Level of consciousness: awake  Pain score: 0  Pain management: adequate  Airway patency: patent  Cardiovascular status: acceptable  Respiratory status: acceptable  Hydration status: acceptable  Postoperative Nausea and Vomiting: none    No notable events documented.

## 2024-02-19 NOTE — PROGRESS NOTES
Ruma Manzanares is a 43 y.o. female admitted for Gastrointestinal hemorrhage, unspecified gastrointestinal hemorrhage type. Pharmacy reviewed the patient's [id number] medications and allergies for accuracy.    The list below reflects the PTA list prior to pharmacy medication history. A summary a changes to the PTA medication list has been listed below. Please review each medication in order reconciliation for additional clarification and justification.    Source of information:  Facility list    Medications added:  Vitamin D3 25mcg -1t every day via g tube  Diazepam rectal gel 10mg -10mg rectally q6 prn  Ibuprofen 100mg/5ml q6 prn via g tube  Multivitamin every day via g tube  Ventolin HFA 90mcg 2 puffs every day   Ventolin HFA 90mcg 2p q1 prn    Medications modified:  Calcium carb/Vitamin D3 250/125 unit bid --> every day via g tube   Flovent HFA 110mcg 1 puff bid --> 2 puffs bid  Phenobarbital 64.8mg 1t pm --> 64.8mg 1.5t pm via g tube  Miralax 17g bid --> 15g bid via g tube  Bisacodyl enema 10mg/30ml prn --> Suppository 10mg prn  Changed 7 meds from PO to via Gtube    Medications to be removed:  Calcium carb/ vitamin D3- Duplicate  Albuterol 5mg/ml  Medications of concern:      Prior to Admission Medications   Prescriptions Last Dose Informant Patient Reported? Taking?   PHENobarbitaL 64.8 mg tablet   Yes No   Sig: Take 1 tablet (64.8 mg) by mouth once daily.   PHENobarbitaL 64.8 mg tablet   Yes No   Sig: Take 1 tablet (64.8 mg) by mouth once daily in the evening. Take with meals.   acetaminophen (Tylenol) 160 mg/5 mL elixir   Yes No   Sig: Take by mouth every 4 hours if needed.   albuterol 5 mg/mL nebulizer solution   Yes No   Sig: Take 0.5 mL (2.5 mg) by nebulization every 6 hours if needed for wheezing.   ascorbic acid (Vitamin C) 250 mg tablet   Yes No   Sig: Take 1 tablet (250 mg) by mouth once daily.   baclofen (Lioresal) 10 mg tablet   Yes No   Sig: Take by mouth 3 times a day.   bisacodyl (Fleet  Bisacodyl) 10 mg/30 mL enema   Yes No   Sig: Insert 30 mL (10 mg) into the rectum 1 time.   calcium carbonate-vitamin D3 (Oyster Shell) 250 mg-3.125 mcg (125 unit) tablet   Yes No   Sig: Take 1 tablet by mouth 2 times a day with meals.   calcium carbonate-vitamin D3 (Oyster Shell) 250 mg-3.125 mcg (125 unit) tablet   Yes No   Sig: Take 1 tablet by mouth 2 times a day with meals.   fluticasone (Flovent HFA) 110 mcg/actuation inhaler   Yes No   Sig: Inhale 1 puff 2 times a day. Rinse mouth with water after use to reduce aftertaste and incidence of candidiasis. Do not swallow.   methenamine hippurate (Hiprex) 1 gram tablet   No No   Sig: Take 1 tablet (1 g) by mouth 2 times a day.   polyethylene glycol (Glycolax, Miralax) 17 gram packet   Yes No   Sig: Take 17 g by mouth 2 times a day.      Facility-Administered Medications: None       Flor Joseph CPhT

## 2024-02-19 NOTE — CARE PLAN
Was able to confirm home medications somewhat with facility paperwork -- majority of which were re-ordered with appropriate levels also ordered   Also noted and confirmed patient is DNR-CCA, chart updated

## 2024-02-20 LAB
BLOOD EXPIRATION DATE: NORMAL
BLOOD EXPIRATION DATE: NORMAL
DISPENSE STATUS: NORMAL
DISPENSE STATUS: NORMAL
GLUCOSE BLD MANUAL STRIP-MCNC: 113 MG/DL (ref 74–99)
PRODUCT BLOOD TYPE: 6200
PRODUCT BLOOD TYPE: 6200
PRODUCT CODE: NORMAL
PRODUCT CODE: NORMAL
UNIT ABO: NORMAL
UNIT ABO: NORMAL
UNIT NUMBER: NORMAL
UNIT NUMBER: NORMAL
UNIT RH: NORMAL
UNIT RH: NORMAL
UNIT VOLUME: 350
UNIT VOLUME: 350
XM INTEP: NORMAL
XM INTEP: NORMAL

## 2024-02-20 PROCEDURE — 2500000004 HC RX 250 GENERAL PHARMACY W/ HCPCS (ALT 636 FOR OP/ED): Performed by: STUDENT IN AN ORGANIZED HEALTH CARE EDUCATION/TRAINING PROGRAM

## 2024-02-20 PROCEDURE — 82947 ASSAY GLUCOSE BLOOD QUANT: CPT

## 2024-02-20 PROCEDURE — 99233 SBSQ HOSP IP/OBS HIGH 50: CPT | Performed by: FAMILY MEDICINE

## 2024-02-20 PROCEDURE — 2500000004 HC RX 250 GENERAL PHARMACY W/ HCPCS (ALT 636 FOR OP/ED): Performed by: FAMILY MEDICINE

## 2024-02-20 PROCEDURE — 2500000004 HC RX 250 GENERAL PHARMACY W/ HCPCS (ALT 636 FOR OP/ED): Performed by: INTERNAL MEDICINE

## 2024-02-20 PROCEDURE — 2500000001 HC RX 250 WO HCPCS SELF ADMINISTERED DRUGS (ALT 637 FOR MEDICARE OP): Performed by: STUDENT IN AN ORGANIZED HEALTH CARE EDUCATION/TRAINING PROGRAM

## 2024-02-20 PROCEDURE — C9113 INJ PANTOPRAZOLE SODIUM, VIA: HCPCS | Performed by: STUDENT IN AN ORGANIZED HEALTH CARE EDUCATION/TRAINING PROGRAM

## 2024-02-20 PROCEDURE — 99232 SBSQ HOSP IP/OBS MODERATE 35: CPT | Performed by: PHYSICIAN ASSISTANT

## 2024-02-20 PROCEDURE — 2060000001 HC INTERMEDIATE ICU ROOM DAILY

## 2024-02-20 RX ORDER — SODIUM CHLORIDE, SODIUM LACTATE, POTASSIUM CHLORIDE, CALCIUM CHLORIDE 600; 310; 30; 20 MG/100ML; MG/100ML; MG/100ML; MG/100ML
85 INJECTION, SOLUTION INTRAVENOUS CONTINUOUS
Status: ACTIVE | OUTPATIENT
Start: 2024-02-20 | End: 2024-02-21

## 2024-02-20 RX ADMIN — PHENOBARBITAL 97.2 MG: 32.4 TABLET ORAL at 20:05

## 2024-02-20 RX ADMIN — CEFTRIAXONE SODIUM 1 G: 1 INJECTION, SOLUTION INTRAVENOUS at 06:05

## 2024-02-20 RX ADMIN — MOMETASONE FUROATE 1 PUFF: 220 INHALANT RESPIRATORY (INHALATION) at 18:07

## 2024-02-20 RX ADMIN — PANTOPRAZOLE SODIUM 40 MG: 40 INJECTION, POWDER, FOR SOLUTION INTRAVENOUS at 20:06

## 2024-02-20 RX ADMIN — Medication 3 MG: at 20:06

## 2024-02-20 RX ADMIN — BACLOFEN 15 MG: 10 TABLET ORAL at 15:06

## 2024-02-20 RX ADMIN — PHENOBARBITAL 64.8 MG: 32.4 TABLET ORAL at 08:06

## 2024-02-20 RX ADMIN — PANTOPRAZOLE SODIUM 40 MG: 40 INJECTION, POWDER, FOR SOLUTION INTRAVENOUS at 08:06

## 2024-02-20 RX ADMIN — BACLOFEN 15 MG: 10 TABLET ORAL at 08:06

## 2024-02-20 RX ADMIN — BACLOFEN 15 MG: 10 TABLET ORAL at 20:06

## 2024-02-20 RX ADMIN — MOMETASONE FUROATE 1 PUFF: 220 INHALANT RESPIRATORY (INHALATION) at 06:11

## 2024-02-20 RX ADMIN — SODIUM CHLORIDE, POTASSIUM CHLORIDE, SODIUM LACTATE AND CALCIUM CHLORIDE 85 ML/HR: 600; 310; 30; 20 INJECTION, SOLUTION INTRAVENOUS at 11:47

## 2024-02-20 SDOH — SOCIAL STABILITY: SOCIAL INSECURITY: WITHIN THE LAST YEAR, HAVE YOU BEEN AFRAID OF YOUR PARTNER OR EX-PARTNER?: PATIENT UNABLE TO ANSWER

## 2024-02-20 SDOH — HEALTH STABILITY: MENTAL HEALTH: HOW MANY STANDARD DRINKS CONTAINING ALCOHOL DO YOU HAVE ON A TYPICAL DAY?: PATIENT DOES NOT DRINK

## 2024-02-20 SDOH — HEALTH STABILITY: MENTAL HEALTH
STRESS IS WHEN SOMEONE FEELS TENSE, NERVOUS, ANXIOUS, OR CAN'T SLEEP AT NIGHT BECAUSE THEIR MIND IS TROUBLED. HOW STRESSED ARE YOU?: NOT AT ALL

## 2024-02-20 SDOH — SOCIAL STABILITY: SOCIAL NETWORK: HOW OFTEN DO YOU GET TOGETHER WITH FRIENDS OR RELATIVES?: PATIENT UNABLE TO ANSWER

## 2024-02-20 SDOH — HEALTH STABILITY: MENTAL HEALTH: HOW OFTEN DO YOU HAVE 6 OR MORE DRINKS ON ONE OCCASION?: NEVER

## 2024-02-20 SDOH — HEALTH STABILITY: PHYSICAL HEALTH: ON AVERAGE, HOW MANY MINUTES DO YOU ENGAGE IN EXERCISE AT THIS LEVEL?: 0 MIN

## 2024-02-20 SDOH — ECONOMIC STABILITY: INCOME INSECURITY: IN THE PAST 12 MONTHS, HAS THE ELECTRIC, GAS, OIL, OR WATER COMPANY THREATENED TO SHUT OFF SERVICE IN YOUR HOME?: NO

## 2024-02-20 SDOH — SOCIAL STABILITY: SOCIAL INSECURITY: ARE YOU OR HAVE YOU BEEN THREATENED OR ABUSED PHYSICALLY, EMOTIONALLY, OR SEXUALLY BY ANYONE?: UNABLE TO ASSESS

## 2024-02-20 SDOH — HEALTH STABILITY: MENTAL HEALTH: HOW OFTEN DO YOU HAVE A DRINK CONTAINING ALCOHOL?: NEVER

## 2024-02-20 SDOH — SOCIAL STABILITY: SOCIAL NETWORK: ARE YOU MARRIED, WIDOWED, DIVORCED, SEPARATED, NEVER MARRIED, OR LIVING WITH A PARTNER?: PATIENT UNABLE TO ANSWER

## 2024-02-20 SDOH — SOCIAL STABILITY: SOCIAL INSECURITY
WITHIN THE LAST YEAR, HAVE YOU BEEN KICKED, HIT, SLAPPED, OR OTHERWISE PHYSICALLY HURT BY YOUR PARTNER OR EX-PARTNER?: PATIENT UNABLE TO ANSWER

## 2024-02-20 SDOH — SOCIAL STABILITY: SOCIAL INSECURITY: WERE YOU ABLE TO COMPLETE ALL THE BEHAVIORAL HEALTH SCREENINGS?: YES

## 2024-02-20 SDOH — SOCIAL STABILITY: SOCIAL INSECURITY
WITHIN THE LAST YEAR, HAVE YOU BEEN HUMILIATED OR EMOTIONALLY ABUSED IN OTHER WAYS BY YOUR PARTNER OR EX-PARTNER?: PATIENT UNABLE TO ANSWER

## 2024-02-20 SDOH — SOCIAL STABILITY: SOCIAL INSECURITY: ARE THERE ANY APPARENT SIGNS OF INJURIES/BEHAVIORS THAT COULD BE RELATED TO ABUSE/NEGLECT?: UNABLE TO ASSESS

## 2024-02-20 SDOH — SOCIAL STABILITY: SOCIAL INSECURITY: HAVE YOU HAD THOUGHTS OF HARMING ANYONE ELSE?: UNABLE TO ASSESS

## 2024-02-20 SDOH — SOCIAL STABILITY: SOCIAL NETWORK
DO YOU BELONG TO ANY CLUBS OR ORGANIZATIONS SUCH AS CHURCH GROUPS UNIONS, FRATERNAL OR ATHLETIC GROUPS, OR SCHOOL GROUPS?: PATIENT UNABLE TO ANSWER

## 2024-02-20 SDOH — SOCIAL STABILITY: SOCIAL INSECURITY: DOES ANYONE TRY TO KEEP YOU FROM HAVING/CONTACTING OTHER FRIENDS OR DOING THINGS OUTSIDE YOUR HOME?: UNABLE TO ASSESS

## 2024-02-20 SDOH — SOCIAL STABILITY: SOCIAL NETWORK: HOW OFTEN DO YOU ATTENT MEETINGS OF THE CLUB OR ORGANIZATION YOU BELONG TO?: PATIENT UNABLE TO ANSWER

## 2024-02-20 SDOH — SOCIAL STABILITY: SOCIAL INSECURITY
WITHIN THE LAST YEAR, HAVE TO BEEN RAPED OR FORCED TO HAVE ANY KIND OF SEXUAL ACTIVITY BY YOUR PARTNER OR EX-PARTNER?: PATIENT UNABLE TO ANSWER

## 2024-02-20 SDOH — ECONOMIC STABILITY: FOOD INSECURITY
WITHIN THE PAST 12 MONTHS, THE FOOD YOU BOUGHT JUST DIDN'T LAST AND YOU DIDN'T HAVE MONEY TO GET MORE.: PATIENT UNABLE TO ANSWER

## 2024-02-20 SDOH — SOCIAL STABILITY: SOCIAL INSECURITY: DO YOU FEEL UNSAFE GOING BACK TO THE PLACE WHERE YOU ARE LIVING?: UNABLE TO ASSESS

## 2024-02-20 SDOH — SOCIAL STABILITY: SOCIAL NETWORK: IN A TYPICAL WEEK, HOW MANY TIMES DO YOU TALK ON THE PHONE WITH FAMILY, FRIENDS, OR NEIGHBORS?: PATIENT UNABLE TO ANSWER

## 2024-02-20 SDOH — SOCIAL STABILITY: SOCIAL NETWORK: HOW OFTEN DO YOU ATTEND CHURCH OR RELIGIOUS SERVICES?: PATIENT UNABLE TO ANSWER

## 2024-02-20 SDOH — SOCIAL STABILITY: SOCIAL INSECURITY: ABUSE: ADULT

## 2024-02-20 SDOH — SOCIAL STABILITY: SOCIAL INSECURITY: DO YOU FEEL ANYONE HAS EXPLOITED OR TAKEN ADVANTAGE OF YOU FINANCIALLY OR OF YOUR PERSONAL PROPERTY?: UNABLE TO ASSESS

## 2024-02-20 SDOH — SOCIAL STABILITY: SOCIAL INSECURITY: HAS ANYONE EVER THREATENED TO HURT YOUR FAMILY OR YOUR PETS?: UNABLE TO ASSESS

## 2024-02-20 SDOH — ECONOMIC STABILITY: FOOD INSECURITY
WITHIN THE PAST 12 MONTHS, YOU WORRIED THAT YOUR FOOD WOULD RUN OUT BEFORE YOU GOT MONEY TO BUY MORE.: PATIENT UNABLE TO ANSWER

## 2024-02-20 SDOH — HEALTH STABILITY: PHYSICAL HEALTH: ON AVERAGE, HOW MANY DAYS PER WEEK DO YOU ENGAGE IN MODERATE TO STRENUOUS EXERCISE (LIKE A BRISK WALK)?: 0 DAYS

## 2024-02-20 ASSESSMENT — LIFESTYLE VARIABLES
SKIP TO QUESTIONS 9-10: 1
AUDIT-C TOTAL SCORE: 0
AUDIT-C TOTAL SCORE: 0
SKIP TO QUESTIONS 9-10: 1
AUDIT-C TOTAL SCORE: 0
HOW OFTEN DO YOU HAVE 6 OR MORE DRINKS ON ONE OCCASION: NEVER
AUDIT-C TOTAL SCORE: 0
SKIP TO QUESTIONS 9-10: 1
HOW MANY STANDARD DRINKS CONTAINING ALCOHOL DO YOU HAVE ON A TYPICAL DAY: PATIENT DOES NOT DRINK
HOW OFTEN DO YOU HAVE A DRINK CONTAINING ALCOHOL: NEVER

## 2024-02-20 ASSESSMENT — PAIN - FUNCTIONAL ASSESSMENT
PAIN_FUNCTIONAL_ASSESSMENT: PAINAD (PAIN ASSESSMENT IN ADVANCED DEMENTIA SCALE)

## 2024-02-20 ASSESSMENT — PAIN SCALES - PAIN ASSESSMENT IN ADVANCED DEMENTIA (PAINAD)
TOTALSCORE: 0
BODYLANGUAGE: RELAXED
BODYLANGUAGE: RELAXED
BREATHING: NORMAL
BREATHING: NORMAL
FACIALEXPRESSION: SMILING OR INEXPRESSIVE
CONSOLABILITY: NO NEED TO CONSOLE
FACIALEXPRESSION: SMILING OR INEXPRESSIVE
TOTALSCORE: 0
CONSOLABILITY: NO NEED TO CONSOLE

## 2024-02-20 ASSESSMENT — COGNITIVE AND FUNCTIONAL STATUS - GENERAL
PERSONAL GROOMING: TOTAL
TURNING FROM BACK TO SIDE WHILE IN FLAT BAD: TOTAL
MOVING FROM LYING ON BACK TO SITTING ON SIDE OF FLAT BED WITH BEDRAILS: TOTAL
CLIMB 3 TO 5 STEPS WITH RAILING: TOTAL
MOVING FROM LYING ON BACK TO SITTING ON SIDE OF FLAT BED WITH BEDRAILS: TOTAL
DRESSING REGULAR UPPER BODY CLOTHING: TOTAL
MOVING TO AND FROM BED TO CHAIR: TOTAL
MOBILITY SCORE: 6
TOILETING: TOTAL
MOVING TO AND FROM BED TO CHAIR: TOTAL
WALKING IN HOSPITAL ROOM: TOTAL
HELP NEEDED FOR BATHING: TOTAL
EATING MEALS: TOTAL
STANDING UP FROM CHAIR USING ARMS: TOTAL
WALKING IN HOSPITAL ROOM: TOTAL
CLIMB 3 TO 5 STEPS WITH RAILING: TOTAL
TURNING FROM BACK TO SIDE WHILE IN FLAT BAD: TOTAL
DAILY ACTIVITIY SCORE: 6
DRESSING REGULAR LOWER BODY CLOTHING: TOTAL
MOBILITY SCORE: 6
STANDING UP FROM CHAIR USING ARMS: TOTAL

## 2024-02-20 ASSESSMENT — PAIN SCALES - WONG BAKER
WONGBAKER_NUMERICALRESPONSE: NO HURT

## 2024-02-20 ASSESSMENT — PATIENT HEALTH QUESTIONNAIRE - PHQ9
2. FEELING DOWN, DEPRESSED OR HOPELESS: NOT AT ALL
SUM OF ALL RESPONSES TO PHQ9 QUESTIONS 1 & 2: 0
1. LITTLE INTEREST OR PLEASURE IN DOING THINGS: NOT AT ALL

## 2024-02-20 ASSESSMENT — ACTIVITIES OF DAILY LIVING (ADL): LACK_OF_TRANSPORTATION: PATIENT UNABLE TO ANSWER

## 2024-02-20 NOTE — CARE PLAN
The patient's goals for the shift include      The clinical goals for the shift include safety      Problem: Pain  Goal: My pain/discomfort is manageable  2/20/2024 1613 by Mercedes Lord RN  Outcome: Progressing  2/20/2024 1613 by Mercedes Lord RN  Outcome: Progressing     Problem: Safety  Goal: Patient will be injury free during hospitalization  2/20/2024 1613 by Mercedes Lord RN  Outcome: Progressing  2/20/2024 1613 by Mercedes Lord RN  Outcome: Progressing  Goal: I will remain free of falls  2/20/2024 1613 by Mercedes Lord RN  Outcome: Progressing  2/20/2024 1613 by Mercedes Lord RN  Outcome: Progressing     Problem: Daily Care  Goal: Daily care needs are met  2/20/2024 1613 by Mercedes Lord RN  Outcome: Progressing  2/20/2024 1613 by Mercedes Lord RN  Outcome: Progressing     Problem: Psychosocial Needs  Goal: Demonstrates ability to cope with hospitalization/illness  2/20/2024 1613 by Mercedes Lord RN  Outcome: Progressing  2/20/2024 1613 by Mercedes Lord RN  Outcome: Progressing  Goal: Collaborate with me, my family, and caregiver to identify my specific goals  2/20/2024 1613 by Mercedes Lord RN  Outcome: Progressing  2/20/2024 1613 by Mercedes Lord RN  Outcome: Progressing  Flowsheets (Taken 2/20/2024 1551)  Cultural Requests During Hospitalization: none  Spiritual Requests During Hospitalization: none     Problem: Discharge Barriers  Goal: My discharge needs are met  2/20/2024 1613 by Mercedes Lord RN  Outcome: Progressing  2/20/2024 1613 by Mercedes Lord RN  Outcome: Progressing     Problem: Skin  Goal: Decreased wound size/increased tissue granulation at next dressing change  2/20/2024 1613 by Mercedes Lord RN  Outcome: Progressing  Flowsheets (Taken 2/20/2024 1613)  Decreased wound size/increased tissue granulation at next dressing change: Protective dressings over bony prominences  2/20/2024 1613 by Mercedes Lord RN  Outcome: Progressing  Flowsheets (Taken  2/20/2024 1613)  Decreased wound size/increased tissue granulation at next dressing change: Protective dressings over bony prominences  Goal: Participates in plan/prevention/treatment measures  2/20/2024 1613 by Mercedes Lord RN  Outcome: Progressing  Flowsheets (Taken 2/20/2024 1613)  Participates in plan/prevention/treatment measures: Elevate heels  2/20/2024 1613 by Mercedes Lord RN  Outcome: Progressing  Flowsheets (Taken 2/20/2024 1613)  Participates in plan/prevention/treatment measures: Elevate heels  Goal: Prevent/manage excess moisture  2/20/2024 1613 by Mercedes Lord RN  Outcome: Progressing  Flowsheets (Taken 2/20/2024 1613)  Prevent/manage excess moisture: Monitor for/manage infection if present  2/20/2024 1613 by Mercedes Lord RN  Outcome: Progressing  Flowsheets (Taken 2/20/2024 1613)  Prevent/manage excess moisture: Monitor for/manage infection if present  Goal: Prevent/minimize sheer/friction injuries  2/20/2024 1613 by Mercedes Lord RN  Outcome: Progressing  Flowsheets (Taken 2/20/2024 1613)  Prevent/minimize sheer/friction injuries: Turn/reposition every 2 hours/use positioning/transfer devices  2/20/2024 1613 by Mercedes Lord RN  Outcome: Progressing  Flowsheets (Taken 2/20/2024 1613)  Prevent/minimize sheer/friction injuries: Turn/reposition every 2 hours/use positioning/transfer devices  Goal: Promote/optimize nutrition  2/20/2024 1613 by Mercedes Lord RN  Outcome: Progressing  Flowsheets (Taken 2/20/2024 1613)  Promote/optimize nutrition: Monitor/record intake including meals  2/20/2024 1613 by Mercedes Lord RN  Outcome: Progressing  Flowsheets (Taken 2/20/2024 1613)  Promote/optimize nutrition: Monitor/record intake including meals  Goal: Promote skin healing  2/20/2024 1613 by Mercedes Lord RN  Outcome: Progressing  Flowsheets (Taken 2/20/2024 1613)  Promote skin healing: Turn/reposition every 2 hours/use positioning/transfer devices  2/20/2024 1613 by Mercedes YOUNG  JUAN Lord  Outcome: Progressing  Flowsheets (Taken 2/20/2024 1613)  Promote skin healing: Turn/reposition every 2 hours/use positioning/transfer devices     Problem: Nutrition  Goal: Less than 5 days NPO/clear liquids  2/20/2024 1613 by Mercedes Lord RN  Outcome: Progressing  2/20/2024 1613 by Mercedes Lord RN  Outcome: Progressing  Goal: Nutrition support goals are met within 48 hrs  2/20/2024 1613 by Mercedes Lord RN  Outcome: Progressing  2/20/2024 1613 by Mercedes Lord RN  Outcome: Progressing  Goal: Tube feed tolerance  2/20/2024 1613 by Mercedes Lord RN  Outcome: Progressing  2/20/2024 1613 by Mercedes Lord RN  Outcome: Progressing  Goal: BG  mg/dL  2/20/2024 1613 by Mercedes Lord RN  Outcome: Progressing  2/20/2024 1613 by Mercedes Lord RN  Outcome: Progressing  Goal: Maintain stable weight  2/20/2024 1613 by Mercedes Lord RN  Outcome: Progressing  2/20/2024 1613 by Mercedes Lord RN  Outcome: Progressing     Problem: Pain - Adult  Goal: Verbalizes/displays adequate comfort level or baseline comfort level  2/20/2024 1613 by Mercedes Lord RN  Outcome: Progressing  2/20/2024 1613 by Mercedes Lord RN  Outcome: Progressing     Problem: Safety - Adult  Goal: Free from fall injury  2/20/2024 1613 by Mercedes Lord RN  Outcome: Progressing  2/20/2024 1613 by Mercedes Lord RN  Outcome: Progressing     Problem: Discharge Planning  Goal: Discharge to home or other facility with appropriate resources  2/20/2024 1613 by Mercedes Lord RN  Outcome: Progressing  2/20/2024 1613 by Mercedes Lord RN  Outcome: Progressing     Problem: Chronic Conditions and Co-morbidities  Goal: Patient's chronic conditions and co-morbidity symptoms are monitored and maintained or improved  2/20/2024 1613 by Mercedes Lord RN  Outcome: Progressing  2/20/2024 1613 by Mercedes Lord RN  Outcome: Progressing

## 2024-02-20 NOTE — PROGRESS NOTES
Per rounds with Dr. Palacios, GI plans to re-scope her in 48-72 hours. When ready will return to .

## 2024-02-20 NOTE — PROGRESS NOTES
Ruma Manzanares is a 43 y.o. female on day 2 of admission presenting with Gastrointestinal hemorrhage, unspecified gastrointestinal hemorrhage type.    Subjective   Patient seen asleep in bed. She does not answer questions at baseline. No reported overnight events.     ROS deferred due to altered mental status       Objective     Physical Exam  Gen: Adult female, no acute distress  HEENT: Normocephalic, atraumatic, good dentition, no oral lesions appreciated  Neck: Supple. No cervical lymphadenopathy appreciated, no thyroid enlargement appreciated  CV: Regular rate and rhythm, S1 and S2 present, no murmurs rubs or gallops appreciated  Resp: Lungs clear to auscultation bilaterally, no ronchi, rales or wheezes appreciated  Abdomen: soft, nontender, nondistended, no guarding, no masses appreciated. G-tube present in abdomen, no bleeding appreciated  MSK: No joint swelling appreciated, full ROM  Psych: appropriate mood and affect  Neuro: alert, does not appear oriented    Last Recorded Vitals  Blood pressure 129/82, pulse 66, temperature 36.6 °C (97.9 °F), temperature source Temporal, resp. rate 18, height 1.219 m (4'), weight 51.8 kg (114 lb 3.2 oz), SpO2 93 %.  Intake/Output last 3 Shifts:  I/O last 3 completed shifts:  In: 4022.5 (77.7 mL/kg) [I.V.:800 (15.4 mL/kg); Blood:3222.5]  Out: 750 (14.5 mL/kg) [Urine:750 (0.4 mL/kg/hr)]  Weight: 51.8 kg       Relevant Results  Scheduled medications  baclofen, 15 mg, g-tube, TID  cefTRIAXone, 1 g, intravenous, q24h  melatonin, 3 mg, oral, Daily  mometasone, 1 puff, inhalation, BID  pantoprazole, 40 mg, intravenous, BID  PHENobarbitaL, 64.8 mg, oral, q AM  PHENobarbitaL, 97.2 mg, oral, Nightly  polyethylene glycol, 17 g, oral, Daily      Continuous medications  lactated Ringer's, 85 mL/hr, Last Rate: 85 mL/hr (02/20/24 1147)      PRN medications  PRN medications: bisacodyl, bisacodyl, guaiFENesin, ondansetron **OR** ondansetron    Results for orders placed or performed during  the hospital encounter of 02/18/24 (from the past 24 hour(s))   Hemoglobin and hematocrit, blood   Result Value Ref Range    Hemoglobin 10.6 (L) 12.0 - 16.0 g/dL    Hematocrit 31.0 (L) 36.0 - 46.0 %   Hemoglobin and hematocrit, blood   Result Value Ref Range    Hemoglobin 11.4 (L) 12.0 - 16.0 g/dL    Hematocrit 32.9 (L) 36.0 - 46.0 %   POCT GLUCOSE   Result Value Ref Range    POCT Glucose 113 (H) 74 - 99 mg/dL       EGD    Result Date: 2/19/2024  Table formatting from the original result was not included. Impression Single ulcer in the fundus of the stomach with clean base (Sam III) The esophagus and duodenum appeared normal. Findings Single 40 mm large, deep, round ulcer in the fundus of the stomach with clean base (Sam III). Large amount of old blood in the Stomach . No actively bleeding site(s) in stomach, Esophagus or duodenum The esophagus and duodenum appeared normal. Recommendation  Follow up with PCP  Protonix 40mg I.v. Q 12 hours  x 72 hours , then continue I.v.s Repeat EGD : in 2 days for Biopsies Transfuse PRBCs as needed to maintain Hgb > 8.0gm%  Indication Gastrointestinal hemorrhage, unspecified gastrointestinal hemorrhage type Staff Staff Role Efren Mccallum MD Proceduralist Medications See Anesthesia Record. Preprocedure A history and physical has been performed, and patient medication allergies have been reviewed. The patient's tolerance of previous anesthesia has been reviewed. The risks and benefits of the procedure and the sedation options and risks were discussed with the patient. All questions were answered and informed consent obtained. Details of the Procedure The patient underwent monitored anesthesia care, which was administered by an anesthesia professional. The patient's blood pressure, ECG, ETCO2, heart rate, level of consciousness, oxygen and respirations were monitored throughout the procedure. The scope was introduced through the mouth and advanced to the second part of  the duodenum. Retroflexion was performed in the cardia and incisura. Prior to the procedure, the patient's H. Pylori status was unknown. The patient experienced no blood loss. The procedure was not difficult. The patient tolerated the procedure well. There were no apparent adverse events. Events Procedure Events Event Event Time ENDO SCOPE IN TIME 2/19/2024  1:12 PM ENDO SCOPE OUT TIME 2/19/2024  1:21 PM Specimens No specimens collected Procedure Location [address] [address] [phone] Referring Provider No referring provider defined for this encounter. Procedure Provider No name on file     CT angio abdomen pelvis w and or wo IV IV contrast    Result Date: 2/18/2024  Interpreted By:  Mahendra Sethi, STUDY: CT ANGIO ABDOMEN PELVIS W AND/OR WO IV IV CONTRAST;  2/18/2024 7:58 pm   INDICATION: Signs/Symptoms:abdominal distention; blood from G tube. History of Nissen fundoplication.   COMPARISON: 11/7/2006   ACCESSION NUMBER(S): [id number]   ORDERING CLINICIAN: YANNI BOYER   TECHNIQUE: Contiguous axial images of the abdomen and pelvis were obtained with and without intravenous contrast. And sagittal reformatted images were obtained from the axial images. MIPS and 3D reformatted images were also performed and reviewed.   FINDINGS: There is limited evaluation of the lung bases. Bilateral subsegmental atelectasis. No pleural effusion.   Evaluation the abdomen pelvis is limited secondary to patient motion and beam hardening artifact secondary to extensive thoracolumbar fusion hardware and also inferior position of the patient's arms.   No evidence of liver mass. The gallbladder is present. No dilatation of common bile duct.   The pancreas, spleen, and adrenal glands appear unremarkable.   Limited evaluation of the kidneys secondary to beam hardening artifact. A subcentimeter hypodensity in the left kidney is too small to characterize. No  hydronephrosis.   There is postsurgical change of Nissen fundoplication. There is new paraesophageal hiatal hernia with herniation of segment of the stomach extends superiorly along the course of the esophagus, and incompletely imaged superiorly. A PEG tube is noted. There heterogeneity content in the stomach which may correspond to ingested content. No evidence of bowel obstruction. There is large amount of stool throughout the colon. No definite evidence of exaggeration of contrast in the bowel.   No evidence of abdominal aortic aneurysm or dissection. The origins of the celiac artery, superior mesenteric artery, bilateral renal arteries, and the inferior mesenteric artery are patent.   Urinary bladder is underdistended and not well evaluated. There is however evidence of prominent urinary bladder wall thickening.   Limited evaluation of the uterus and adnexa.   Scoliotic curvature and posterior of extensive thoracolumbar fusion. There is also associated fusion of bilateral sacroiliac joints. There is bilateral chronic hip dysplasia.       Postsurgical change of Nissen fundoplication. There is new paraesophageal hiatal hernia which is incompletely imaged superiorly. PEG tube is noted with balloon satisfactorily inflated in the stomach. No evidence of extravasation contrast to suggest acute active gastrointestinal bleed.   Large amount of stool throughout the colon; please correlate for constipation.   Urinary bladder wall thickening; please correlate with urinalysis for cystitis.   MACRO: None   Signed by: Mahendra Sethi 2/18/2024 9:00 PM Dictation workstation:   [id number]    XR chest 1 view    Result Date: 2/18/2024  Interpreted By:  Carlos Rodgers, STUDY: XR CHEST 1 VIEW;  2/18/2024 7:25 pm   INDICATION: Signs/Symptoms:facility concern for aspiration.   COMPARISON: 09/19/2016   ACCESSION NUMBER(S): [id number]   ORDERING CLINICIAN: YANNI BOYER   FINDINGS: Patient rotation limits evaluation. There is no  evidence for focal consolidation. The cardiomediastinal silhouette is prominent and stable.   Redemonstration of long segment thoracic spinal fusion rods with multilevel cerclage wire reinforcement. There is discontinuity of the rods in the lower thoracic/lumbosacral region, 1 of which was present prior study in the other appearing new from prior study.       No evidence of aspiration. New hardware fracture involving the right sided spinal fusion chad and unchanged fracture involving the left spinal fusion chad at the similar level.   MACRO: None.   Signed by: Carlos Rogders 2/18/2024 7:40 PM Dictation workstation:   [id number]           This patient currently has cardiac telemetry ordered; if you would like to modify or discontinue the telemetry order, click here to go to the orders activity to modify/discontinue the order.                 Assessment/Plan   Principal Problem:    Gastrointestinal hemorrhage, unspecified gastrointestinal hemorrhage type  Active Problems:    Asthma    Cerebral palsy (CMS/HCC)    Dysphagia    Epilepsy (CMS/HCC)    Flexion contractures    GERD (gastroesophageal reflux disease)    Profound intellectual disability    Scoliosis    Spastic quadriparesis secondary to cerebral palsy (CMS/HCC)    Recurrent UTI    Neurogenic bladder    Acute blood loss anemia    Other constipation      ABLA suspected 2/2 UGIB   -Evidence of bleeding in her G tube from facility  -No active extravasation on CTA; however, elevated BUN (31) compared to baseline with Hgb 15 (about a month ago) --> 10.3 -> 7.3  - s/p 2u pRBC, will monitor CBC and transfuse for goal Hb >8.0  -Lactate OK at 1.7  -Continue to trend H&H  -Continue pantoprazole BID x2 days more then reduce to QD  -Check basic anemia labs (will hold off ferritin because this was 24 within the last month)  -GI consulted  -s/p emergency EGD, as per note large amount of old blood present with large circular ulcer crater with no active bleed.   -plan to  repeat EGD in 2-3 days and biopsy ulcer, then repeat EGD in 3 months     Constipation / large stool burden, abdominal distention   -1x enema ordered  -Continued on bowel regimen   -Follow abdominal exam   -GI consult appreciated     Leukocytosis   -Presenting with elevated WBCs at 17.2. Lactate 1.7   -Negative CXR for acute process. No obvious infectious etiology on abdominal imaging.   -History of recurrent UTIs with bladder wall thickening on CT --> ordered UA   -Ordered blood cultures x2 out of abundance of caution   -Continue to trend fever curve, WBCs     New fracture of right-sided thoracic spinal hardware; known L sided thoracic spinal hardware fracture   -Surgery was completed in 1988 per records   -Suspect this can likely be followed up in the outpatient setting      Cerebral palsy with spastic quadriplegia  -Confirm and resume home medications  -Resident of [facility]      Seizure disorder   -Patient was given home dose of phenobarb in the ED x1  -Working on confirming the remainder of home meds / updating home med list  -as per patient's outpatient notes it appears she will typically get generalized tonic-clonic seizures 4-6x a year      Profound intellectual disability, baseline nonverbal status  -Resident of [facility] since age 13     Dysphagia s/p G-tube  -NPO, tube feeds as per nutrition. Previously on 900mL nutren w fiber + 2 scoops beneprotein + 2tbsp nutrisource fiber +1/4tsp salt +H2O = 1750mL divided into 5 feeds of 350mL each via g tube, 30mL water flush after each feed (12am, 6am, 12pm, 4:30pm, 8pm)  -SLP consult  -Nutrition consultation appreciated moving forward     Neurogenic bladder (requires CIC at her facility) with recurrent UTIs  -UA shows 4+ bacteria, likely asymptomatic  -Can continue with straight cath / bladder scans  -continue ceftriaxone     Asthma without acute exacerbation   -Confirm and resume home therapies                 I spent 20 minutes in the professional and overall care  of this patient.      Shay Radford MD

## 2024-02-20 NOTE — PROGRESS NOTES
Speech-Language Pathology                 Therapy Communication Note and Discharge Summary    Patient Name: Ruma Manzanares  MRN: [id number]  Today's Date: 2/20/2024     Discipline: Speech Language Pathology    Missed Time: Cancel    Comment: Order received for clinical swallow evaluation. SLP spoke with nursing staff from [facility]. Pt is NPO at baseline and takes all nutrition/hydration/medications via PEG tube. Pt not appropriate for clinical swallow evaluation. Will DC SLP orders. Thank you.

## 2024-02-20 NOTE — CONSULTS
Wound Care Consult     Visit Date: 2/20/2024      Patient Name: Ruma Manzanares         MRN: [id number]           YOB: 1980     Patient seen for wound consult for skin check due to patient at high risk for skin break down complicated by PMH: profound intellectual disability, cerebral palsy, spastic quadriplegia, epilepsy, asthma, dysphagia, GERD, neurogenic bladder, left hydronephrosis and scoliosis. Patient resides at [facility]. Exam conducted with PCA Mary to assist with positioning. Skin clean dry intact, no wounds noted. Small blanchable area noted to left heel likely from SCD tubing. SCDs removed, okay from Dr. Radford to leave off as patient is contracted and at risk of developing skin breakdown from additional medical devices. Plan of care for prevention reviewed with bedside staff to include recommendations below.    Prevention measures to include:  Preventative bordered foam to bilateral heels, elbow and sacrum. Pillow support placed between knees and to offload heels. Reposition at least every 2 hours, pillows for positioning support and heel offloading. Neck pillow in place. Keep skin clean/dry, barrier cream as needed after incontinent care.     Therapeutic surface: Patient on [place] [employer] during exam. Positioned onto right side after exam. Staff to continue to turn and reposition patient atleast every 2 hours. Offload heels with pillow supports.     Nursing updated, continue pressure injury preventions, nursing to follow provider orders and re-consult wound RN if needed.             Please contact me with questions or changes in patient condition.  Myriam Etienne. RN  Wound/Ostomy Care  [phone]

## 2024-02-20 NOTE — PROGRESS NOTES
Ruma Manzanares is a 43 y.o. female on day 1 of admission presenting with Gastrointestinal hemorrhage, unspecified gastrointestinal hemorrhage type.    Subjective   Patient seen awake in bed. She does not answer questions and makes monosyllabic moans. No reported overnight events.     ROS deferred due to altered mental status       Objective     Physical Exam  Gen: Adult female, no acute distress  HEENT: Normocephalic, atraumatic, good dentition, no oral lesions appreciated  Neck: Supple. No cervical lymphadenopathy appreciated, no thyroid enlargement appreciated  CV: Regular rate and rhythm, S1 and S2 present, no murmurs rubs or gallops appreciated  Resp: Lungs clear to auscultation bilaterally, no ronchi, rales or wheezes appreciated  Abdomen: soft, nontender, nondistended, no guarding, no masses appreciated. G-tube present in abdomen, no bleeding appreciated  MSK: No joint swelling appreciated, full ROM  Psych: appropriate mood and affect  Neuro: alert, does not appear oriented    Last Recorded Vitals  Blood pressure 124/71, pulse 95, temperature 36.8 °C (98.2 °F), temperature source Temporal, resp. rate 16, height 1.219 m (4'), weight (!) 42.6 kg (94 lb), SpO2 91 %.  Intake/Output last 3 Shifts:  I/O last 3 completed shifts:  In: 300 (7 mL/kg) [I.V.:300 (7 mL/kg)]  Out: - (0 mL/kg)   Weight: 42.6 kg       Relevant Results  Scheduled medications  baclofen, 15 mg, oral, Once  baclofen, 15 mg, g-tube, TID  cefTRIAXone, 1 g, intravenous, q24h  melatonin, 3 mg, oral, Daily  mometasone, 1 puff, inhalation, BID  pantoprazole, 40 mg, intravenous, BID  PHENobarbitaL, 64.8 mg, oral, Once  PHENobarbitaL, 64.8 mg, oral, q AM  PHENobarbitaL, 97.2 mg, oral, Nightly  polyethylene glycol, 17 g, oral, Daily      Continuous medications  lactated Ringer's, 85 mL/hr, Last Rate: 85 mL/hr (02/19/24 2028)      PRN medications  PRN medications: bisacodyl, bisacodyl, guaiFENesin, ondansetron **OR** ondansetron    Results for orders placed  or performed during the hospital encounter of 02/18/24 (from the past 24 hour(s))   Blood Culture    Specimen: Peripheral Venipuncture; Blood culture   Result Value Ref Range    Blood Culture Loaded on Instrument - Culture in progress    Blood Culture    Specimen: Peripheral Venipuncture; Blood culture   Result Value Ref Range    Blood Culture Loaded on Instrument - Culture in progress    Urinalysis with Reflex Microscopic   Result Value Ref Range    Color, Urine Elke (N) Straw, Yellow    Appearance, Urine Hazy (N) Clear    Specific Gravity, Urine 1.015 1.005 - 1.035    pH, Urine 6.0 5.0, 5.5, 6.0, 6.5, 7.0, 7.5, 8.0    Protein, Urine 30 (1+) (N) NEGATIVE mg/dL    Glucose, Urine NEGATIVE NEGATIVE mg/dL    Blood, Urine LARGE (3+) (A) NEGATIVE    Ketones, Urine NEGATIVE NEGATIVE mg/dL    Bilirubin, Urine NEGATIVE NEGATIVE    Urobilinogen, Urine <2.0 <2.0 mg/dL    Nitrite, Urine NEGATIVE NEGATIVE    Leukocyte Esterase, Urine SMALL (1+) (A) NEGATIVE   Microscopic Only, Urine   Result Value Ref Range    WBC, Urine 21-50 (A) 1-5, NONE /HPF    RBC, Urine >20 (A) NONE, 1-2, 3-5 /HPF    Bacteria, Urine 4+ (A) NONE SEEN /HPF   Hemoglobin and hematocrit, blood   Result Value Ref Range    Hemoglobin 7.3 (L) 12.0 - 16.0 g/dL    Hematocrit 22.2 (L) 36.0 - 46.0 %   Prepare RBC: 2 Units   Result Value Ref Range    PRODUCT CODE L1688T47     Unit Number [id number]     Unit ABO A     Unit RH POS     XM INTEP COMP     Dispense Status IS     Blood Expiration Date March 18, 2024 23:59 EDT     PRODUCT BLOOD TYPE 6200     UNIT VOLUME 350     PRODUCT CODE G1068F31     Unit Number [id number]     Unit ABO A     Unit RH POS     XM INTEP COMP     Dispense Status IS     Blood Expiration Date March 19, 2024 23:59 EDT     PRODUCT BLOOD TYPE 6200     UNIT VOLUME 350    VERIFY ABO/Rh Group Test   Result Value Ref Range    ABO TYPE A     Rh TYPE POS    Iron and TIBC   Result Value Ref Range    Iron 45 35 - 150 ug/dL    UIBC 235 110 - 370  ug/dL    TIBC 280 240 - 445 ug/dL    % Saturation 16 (L) 25 - 45 %   Folate   Result Value Ref Range    Folate, Serum 19.1 >5.0 ng/mL   D-Dimer, Non VTE   Result Value Ref Range    D-Dimer Non VTE, Quant (ng/mL FEU) 663 (H) <=500 ng/mL FEU   Lactate Dehydrogenase   Result Value Ref Range     84 - 246 U/L   Vitamin B12   Result Value Ref Range    Vitamin B12 564 211 - 911 pg/mL   Phenobarbital level   Result Value Ref Range    Phenobarbital  20.5 10.0 - 40.0 ug/mL   Hemoglobin and hematocrit, blood   Result Value Ref Range    Hemoglobin 10.6 (L) 12.0 - 16.0 g/dL    Hematocrit 31.0 (L) 36.0 - 46.0 %   Hemoglobin and hematocrit, blood   Result Value Ref Range    Hemoglobin 11.4 (L) 12.0 - 16.0 g/dL    Hematocrit 32.9 (L) 36.0 - 46.0 %       EGD    Result Date: 2/19/2024  Table formatting from the original result was not included. Impression Single ulcer in the fundus of the stomach with clean base (Sam III) The esophagus and duodenum appeared normal. Findings Single 40 mm large, deep, round ulcer in the fundus of the stomach with clean base (Sam III). Large amount of old blood in the Stomach . No actively bleeding site(s) in stomach, Esophagus or duodenum The esophagus and duodenum appeared normal. Recommendation  Follow up with PCP  Protonix 40mg I.v. Q 12 hours  x 72 hours , then continue I.v.s Repeat EGD : in 2 days for Biopsies Transfuse PRBCs as needed to maintain Hgb > 8.0gm%  Indication Gastrointestinal hemorrhage, unspecified gastrointestinal hemorrhage type Staff Staff Role Efren Mccallum MD Proceduralist Medications See Anesthesia Record. Preprocedure A history and physical has been performed, and patient medication allergies have been reviewed. The patient's tolerance of previous anesthesia has been reviewed. The risks and benefits of the procedure and the sedation options and risks were discussed with the patient. All questions were answered and informed consent obtained. Details of the  Procedure The patient underwent monitored anesthesia care, which was administered by an anesthesia professional. The patient's blood pressure, ECG, ETCO2, heart rate, level of consciousness, oxygen and respirations were monitored throughout the procedure. The scope was introduced through the mouth and advanced to the second part of the duodenum. Retroflexion was performed in the cardia and incisura. Prior to the procedure, the patient's H. Pylori status was unknown. The patient experienced no blood loss. The procedure was not difficult. The patient tolerated the procedure well. There were no apparent adverse events. Events Procedure Events Event Event Time ENDO SCOPE IN TIME 2/19/2024  1:12 PM ENDO SCOPE OUT TIME 2/19/2024  1:21 PM Specimens No specimens collected Procedure Location [address] [id number] [phone] Referring Provider No referring provider defined for this encounter. Procedure Provider No name on file     CT angio abdomen pelvis w and or wo IV IV contrast    Result Date: 2/18/2024  Interpreted By:  Mahendra Sethi, STUDY: CT ANGIO ABDOMEN PELVIS W AND/OR WO IV IV CONTRAST;  2/18/2024 7:58 pm   INDICATION: Signs/Symptoms:abdominal distention; blood from G tube. History of Nissen fundoplication.   COMPARISON: 11/7/2006   ACCESSION NUMBER(S): [id number]   ORDERING CLINICIAN: YANNI BOYER   TECHNIQUE: Contiguous axial images of the abdomen and pelvis were obtained with and without intravenous contrast. And sagittal reformatted images were obtained from the axial images. MIPS and 3D reformatted images were also performed and reviewed.   FINDINGS: There is limited evaluation of the lung bases. Bilateral subsegmental atelectasis. No pleural effusion.   Evaluation the abdomen pelvis is limited secondary to patient motion and beam hardening artifact secondary to extensive thoracolumbar fusion hardware and also inferior position of  the patient's arms.   No evidence of liver mass. The gallbladder is present. No dilatation of common bile duct.   The pancreas, spleen, and adrenal glands appear unremarkable.   Limited evaluation of the kidneys secondary to beam hardening artifact. A subcentimeter hypodensity in the left kidney is too small to characterize. No hydronephrosis.   There is postsurgical change of Nissen fundoplication. There is new paraesophageal hiatal hernia with herniation of segment of the stomach extends superiorly along the course of the esophagus, and incompletely imaged superiorly. A PEG tube is noted. There heterogeneity content in the stomach which may correspond to ingested content. No evidence of bowel obstruction. There is large amount of stool throughout the colon. No definite evidence of exaggeration of contrast in the bowel.   No evidence of abdominal aortic aneurysm or dissection. The origins of the celiac artery, superior mesenteric artery, bilateral renal arteries, and the inferior mesenteric artery are patent.   Urinary bladder is underdistended and not well evaluated. There is however evidence of prominent urinary bladder wall thickening.   Limited evaluation of the uterus and adnexa.   Scoliotic curvature and posterior of extensive thoracolumbar fusion. There is also associated fusion of bilateral sacroiliac joints. There is bilateral chronic hip dysplasia.       Postsurgical change of Nissen fundoplication. There is new paraesophageal hiatal hernia which is incompletely imaged superiorly. PEG tube is noted with balloon satisfactorily inflated in the stomach. No evidence of extravasation contrast to suggest acute active gastrointestinal bleed.   Large amount of stool throughout the colon; please correlate for constipation.   Urinary bladder wall thickening; please correlate with urinalysis for cystitis.   MACRO: None   Signed by: Mahendra eSthi 2/18/2024 9:00 PM Dictation workstation:   [id number]    XR chest 1  view    Result Date: 2/18/2024  Interpreted By:  Carlos Rodgers, STUDY: XR CHEST 1 VIEW;  2/18/2024 7:25 pm   INDICATION: Signs/Symptoms:facility concern for aspiration.   COMPARISON: 09/19/2016   ACCESSION NUMBER(S): [id number]   ORDERING CLINICIAN: YANNI BOYER   FINDINGS: Patient rotation limits evaluation. There is no evidence for focal consolidation. The cardiomediastinal silhouette is prominent and stable.   Redemonstration of long segment thoracic spinal fusion rods with multilevel cerclage wire reinforcement. There is discontinuity of the rods in the lower thoracic/lumbosacral region, 1 of which was present prior study in the other appearing new from prior study.       No evidence of aspiration. New hardware fracture involving the right sided spinal fusion chad and unchanged fracture involving the left spinal fusion chad at the similar level.   MACRO: None.   Signed by: Carlos Rodgers 2/18/2024 7:40 PM Dictation workstation:   [id number]           This patient currently has cardiac telemetry ordered; if you would like to modify or discontinue the telemetry order, click here to go to the orders activity to modify/discontinue the order.                 Assessment/Plan   Principal Problem:    Gastrointestinal hemorrhage, unspecified gastrointestinal hemorrhage type  Active Problems:    Asthma    Cerebral palsy (CMS/HCC)    Dysphagia    Epilepsy (CMS/HCC)    Flexion contractures    GERD (gastroesophageal reflux disease)    Profound intellectual disability    Scoliosis    Spastic quadriparesis secondary to cerebral palsy (CMS/HCC)    Recurrent UTI    Neurogenic bladder    Acute blood loss anemia    Other constipation      ABLA suspected 2/2 UGIB   -Patient presenting with evidence of bleeding in her G tube from facility  -No active extrav on CTA; however, elevated BUN (31) compared to baseline with Hgb 15 (about a month ago) --> 10.3 -> 7.3  - s/p 2u pRBC, will monitor CBC and transfuse for goal Hb  >8.0  -Lactate OK at 1.7  -Continue to trend H&H  -Continue pantoprazole BID x3 days more then reduce to QD  -Check basic anemia labs (will hold off ferritin because this was 24 within the last month)  -GI consultated  -s/p emergency EGD, as per note large amount of old blood present with large circular ulcer crater with no active bleed.   -plan to repeat EGD in 2-3 days and biopsy ulcer, then repeat EGD in 3 months     Constipation / large stool burden, abdominal distention   -1x enema ordered  -Continued on bowel regimen   -Follow abdominal exam   -GI consult appreciated     Leukocytosis   -Presenting with elevated WBCs at 17.2. Lactate 1.7   -Negative CXR for acute process. No obvious infectious etiology on abdominal imaging.   -History of recurrent UTIs with bladder wall thickening on CT --> ordered UA   -Ordered blood cultures x2 out of abundance of caution   -Continue to trend fever curve, WBCs     New fracture of right-sided thoracic spinal hardware; known L sided thoracic spinal hardware fracture   -Surgery was completed in 1988 per records   -Suspect this can likely be followed up in the outpatient setting      Cerebral palsy with spastic quadriplegia  -Confirm and resume home medications  -Resident of [facility]      Seizure disorder   -Patient was given home dose of phenobarb in the ED x1  -Working on confirming the remainder of home meds / updating home med list  -I reviewed the patient's outpatient notes and it appears she will typically get generalized tonic-clonic seizures 4-6x a year      Profound intellectual disability, baseline nonverbal status  -Resident of [facility] since age 13     Dysphagia s/p G-tube  -NPO, tube feeds as per nutrition  -SLP consult  -Nutrition consultation appreciated moving forward     Neurogenic bladder (requires CIC at her facility) with recurrent UTIs  -UA shows 4+ bacteria, likely asymptomatic  -Can continue with straight cath / bladder scans  -continue ceftriaxone      Asthma without acute exacerbation   -Confirm and resume home therapies                 I spent 40 minutes in the professional and overall care of this patient.      Shay Radford MD

## 2024-02-20 NOTE — PROGRESS NOTES
Ruma Manzanares is a 43 y.o. female on day 2 of admission presenting with Gastrointestinal hemorrhage, unspecified gastrointestinal hemorrhage type.    Subjective   Patient seen and examined. She is sleeping. PCA states that she has been sleeping and resting comfortably most of the morning. Discussed with RN, no vomiting or bloody aspirate from PEG tube. Had a dark BM early this morning. Hemoglobin improved.    10 point ROS unable to be obtained due to mental status.    Objective     Physical Exam  Vitals reviewed.   Constitutional:       General: She is not in acute distress.     Appearance: Normal appearance.   Cardiovascular:      Rate and Rhythm: Normal rate and regular rhythm.   Pulmonary:      Effort: Pulmonary effort is normal.      Breath sounds: Normal breath sounds.   Abdominal:      General: Bowel sounds are normal. There is no distension.      Palpations: Abdomen is soft.      Tenderness: There is no abdominal tenderness. There is no guarding or rebound.      Comments: PEG tube in place, dressing dry and intact   Musculoskeletal:      Comments: Contractures present in extremities   Neurological:      Mental Status: She is alert.      Comments: Asleep, unable to assess   Psychiatric:      Comments: Unable to assess         Last Recorded Vitals  Blood pressure 129/82, pulse 66, temperature 36.6 °C (97.9 °F), temperature source Temporal, resp. rate 18, height 1.219 m (4'), weight 51.8 kg (114 lb 3.2 oz), SpO2 93 %.  Intake/Output last 3 Shifts:  I/O last 3 completed shifts:  In: 4022.5 (77.7 mL/kg) [I.V.:800 (15.4 mL/kg); Blood:3222.5]  Out: 750 (14.5 mL/kg) [Urine:750 (0.4 mL/kg/hr)]  Weight: 51.8 kg     Relevant Results          * Cannot find OR log *  Last relevant procedure:        This patient currently has cardiac telemetry ordered; if you would like to modify or discontinue the telemetry order, click here to go to the orders activity to modify/discontinue the order.                 Assessment/Plan    Principal Problem:    Gastrointestinal hemorrhage, unspecified gastrointestinal hemorrhage type  Active Problems:    Asthma    Cerebral palsy (CMS/HCC)    Dysphagia    Epilepsy (CMS/HCC)    Flexion contractures    GERD (gastroesophageal reflux disease)    Profound intellectual disability    Scoliosis    Spastic quadriparesis secondary to cerebral palsy (CMS/HCC)    Recurrent UTI    Neurogenic bladder    Acute blood loss anemia    Other constipation    Gastric ulcer  43-year-old female with history of intellectual disability, seizure disorder and cerebral palsy.  She has a gastrostomy tube in situ for long-term nutritional management.  On admission, she was found to have PEG draining norm blood per the gastrostomy tube with a drop of hemoglobin down to 7.3 g. She was also passing melena. Dr. Mccallum performed EGD on 2/19 that showed a large amount of old blood in the gastric fundus and cardia as well as a large circular ulcer crater was noted in the high fundus.  There was no active bleeding from the ulcer at the time of the EGD. There was also no visible vessel or adherent clot to the ulcer base. Mucosa of the esophagus and duodenum were normal.   A PEG tube was noted in situ along the anterior gastric wall. Her hemoglobin is improved to 11.4 without significant signs of active bleeding today.     Recommendations:  -Continue BID IV PPI for the next 48 hours  -Can repeat EGD Friday at which time effort should be made to biopsy the edges of the gastric ulcer.   -She will need repeat endoscopy in 3 months following long-term ulcer treatment.   -Monitor for signs and symptoms of active GI bleeding and transfuse PRN per [facility]       Case was reviewed with Dr. Mccallum.      Bev Todd PA-C

## 2024-02-21 LAB
ALBUMIN SERPL BCP-MCNC: 2.8 G/DL (ref 3.4–5)
ANION GAP SERPL CALC-SCNC: 9 MMOL/L (ref 10–20)
BUN SERPL-MCNC: 11 MG/DL (ref 6–23)
CALCIUM SERPL-MCNC: 7.8 MG/DL (ref 8.6–10.3)
CHLORIDE SERPL-SCNC: 110 MMOL/L (ref 98–107)
CO2 SERPL-SCNC: 27 MMOL/L (ref 21–32)
CREAT SERPL-MCNC: 0.29 MG/DL (ref 0.5–1.05)
EGFRCR SERPLBLD CKD-EPI 2021: >90 ML/MIN/1.73M*2
ERYTHROCYTE [DISTWIDTH] IN BLOOD BY AUTOMATED COUNT: 15.2 % (ref 11.5–14.5)
GLUCOSE SERPL-MCNC: 94 MG/DL (ref 74–99)
HCT VFR BLD AUTO: 32.2 % (ref 36–46)
HGB BLD-MCNC: 10.8 G/DL (ref 12–16)
MAGNESIUM SERPL-MCNC: 1.8 MG/DL (ref 1.6–2.4)
MCH RBC QN AUTO: 32.5 PG (ref 26–34)
MCHC RBC AUTO-ENTMCNC: 33.5 G/DL (ref 32–36)
MCV RBC AUTO: 97 FL (ref 80–100)
NRBC BLD-RTO: 0.2 /100 WBCS (ref 0–0)
PHOSPHATE SERPL-MCNC: 3.4 MG/DL (ref 2.5–4.9)
PLATELET # BLD AUTO: 198 X10*3/UL (ref 150–450)
POTASSIUM SERPL-SCNC: 3.4 MMOL/L (ref 3.5–5.3)
RBC # BLD AUTO: 3.32 X10*6/UL (ref 4–5.2)
SODIUM SERPL-SCNC: 143 MMOL/L (ref 136–145)
WBC # BLD AUTO: 10 X10*3/UL (ref 4.4–11.3)

## 2024-02-21 PROCEDURE — C9113 INJ PANTOPRAZOLE SODIUM, VIA: HCPCS | Performed by: STUDENT IN AN ORGANIZED HEALTH CARE EDUCATION/TRAINING PROGRAM

## 2024-02-21 PROCEDURE — 83735 ASSAY OF MAGNESIUM: CPT | Performed by: FAMILY MEDICINE

## 2024-02-21 PROCEDURE — 85027 COMPLETE CBC AUTOMATED: CPT | Performed by: FAMILY MEDICINE

## 2024-02-21 PROCEDURE — 99233 SBSQ HOSP IP/OBS HIGH 50: CPT | Performed by: FAMILY MEDICINE

## 2024-02-21 PROCEDURE — 3E0G76Z INTRODUCTION OF NUTRITIONAL SUBSTANCE INTO UPPER GI, VIA NATURAL OR ARTIFICIAL OPENING: ICD-10-PCS | Performed by: FAMILY MEDICINE

## 2024-02-21 PROCEDURE — 2060000001 HC INTERMEDIATE ICU ROOM DAILY

## 2024-02-21 PROCEDURE — 2500000004 HC RX 250 GENERAL PHARMACY W/ HCPCS (ALT 636 FOR OP/ED): Performed by: INTERNAL MEDICINE

## 2024-02-21 PROCEDURE — 99232 SBSQ HOSP IP/OBS MODERATE 35: CPT | Performed by: PHYSICIAN ASSISTANT

## 2024-02-21 PROCEDURE — 2500000004 HC RX 250 GENERAL PHARMACY W/ HCPCS (ALT 636 FOR OP/ED): Performed by: STUDENT IN AN ORGANIZED HEALTH CARE EDUCATION/TRAINING PROGRAM

## 2024-02-21 PROCEDURE — 36415 COLL VENOUS BLD VENIPUNCTURE: CPT | Performed by: FAMILY MEDICINE

## 2024-02-21 PROCEDURE — 82435 ASSAY OF BLOOD CHLORIDE: CPT | Performed by: FAMILY MEDICINE

## 2024-02-21 PROCEDURE — 2500000001 HC RX 250 WO HCPCS SELF ADMINISTERED DRUGS (ALT 637 FOR MEDICARE OP): Performed by: STUDENT IN AN ORGANIZED HEALTH CARE EDUCATION/TRAINING PROGRAM

## 2024-02-21 RX ADMIN — MOMETASONE FUROATE 1 PUFF: 220 INHALANT RESPIRATORY (INHALATION) at 08:34

## 2024-02-21 RX ADMIN — BACLOFEN 15 MG: 10 TABLET ORAL at 16:34

## 2024-02-21 RX ADMIN — MOMETASONE FUROATE 1 PUFF: 220 INHALANT RESPIRATORY (INHALATION) at 18:22

## 2024-02-21 RX ADMIN — PHENOBARBITAL 64.8 MG: 32.4 TABLET ORAL at 08:29

## 2024-02-21 RX ADMIN — BACLOFEN 15 MG: 10 TABLET ORAL at 22:34

## 2024-02-21 RX ADMIN — PANTOPRAZOLE SODIUM 40 MG: 40 INJECTION, POWDER, FOR SOLUTION INTRAVENOUS at 20:50

## 2024-02-21 RX ADMIN — CEFTRIAXONE SODIUM 1 G: 1 INJECTION, SOLUTION INTRAVENOUS at 06:04

## 2024-02-21 RX ADMIN — BACLOFEN 15 MG: 10 TABLET ORAL at 08:29

## 2024-02-21 RX ADMIN — PHENOBARBITAL 97.2 MG: 32.4 TABLET ORAL at 22:34

## 2024-02-21 RX ADMIN — PANTOPRAZOLE SODIUM 40 MG: 40 INJECTION, POWDER, FOR SOLUTION INTRAVENOUS at 08:29

## 2024-02-21 RX ADMIN — Medication 3 MG: at 22:34

## 2024-02-21 ASSESSMENT — PAIN SCALES - PAIN ASSESSMENT IN ADVANCED DEMENTIA (PAINAD)
FACIALEXPRESSION: SMILING OR INEXPRESSIVE
TOTALSCORE: 0
BREATHING: NORMAL
BODYLANGUAGE: RELAXED
CONSOLABILITY: NO NEED TO CONSOLE

## 2024-02-21 ASSESSMENT — COGNITIVE AND FUNCTIONAL STATUS - GENERAL
PERSONAL GROOMING: TOTAL
WALKING IN HOSPITAL ROOM: TOTAL
CLIMB 3 TO 5 STEPS WITH RAILING: TOTAL
DRESSING REGULAR UPPER BODY CLOTHING: TOTAL
CLIMB 3 TO 5 STEPS WITH RAILING: TOTAL
HELP NEEDED FOR BATHING: TOTAL
MOVING FROM LYING ON BACK TO SITTING ON SIDE OF FLAT BED WITH BEDRAILS: TOTAL
DRESSING REGULAR LOWER BODY CLOTHING: TOTAL
MOVING TO AND FROM BED TO CHAIR: TOTAL
HELP NEEDED FOR BATHING: TOTAL
MOBILITY SCORE: 6
STANDING UP FROM CHAIR USING ARMS: TOTAL
DAILY ACTIVITIY SCORE: 6
MOVING TO AND FROM BED TO CHAIR: TOTAL
TOILETING: TOTAL
TURNING FROM BACK TO SIDE WHILE IN FLAT BAD: TOTAL
EATING MEALS: TOTAL
STANDING UP FROM CHAIR USING ARMS: TOTAL
DRESSING REGULAR UPPER BODY CLOTHING: TOTAL
DAILY ACTIVITIY SCORE: 6
DRESSING REGULAR LOWER BODY CLOTHING: TOTAL
PERSONAL GROOMING: TOTAL
MOBILITY SCORE: 6
TOILETING: TOTAL
EATING MEALS: TOTAL
MOVING FROM LYING ON BACK TO SITTING ON SIDE OF FLAT BED WITH BEDRAILS: TOTAL
TURNING FROM BACK TO SIDE WHILE IN FLAT BAD: TOTAL
WALKING IN HOSPITAL ROOM: TOTAL

## 2024-02-21 ASSESSMENT — PAIN - FUNCTIONAL ASSESSMENT
PAIN_FUNCTIONAL_ASSESSMENT: PAINAD (PAIN ASSESSMENT IN ADVANCED DEMENTIA SCALE)
PAIN_FUNCTIONAL_ASSESSMENT: PAINAD (PAIN ASSESSMENT IN ADVANCED DEMENTIA SCALE)

## 2024-02-21 ASSESSMENT — PAIN SCALES - WONG BAKER
WONGBAKER_NUMERICALRESPONSE: NO HURT
WONGBAKER_NUMERICALRESPONSE: NO HURT

## 2024-02-21 NOTE — CONSULTS
Inpatient consult to gastroenterology  Consult performed by: Abebe Lewis MD  Consult ordered by: Francia Jimenez PA-C            Patient seen as a new consult on 2/19/2024 by Dr. Mccallum (see consult note dated 2/19/2024), but note was incorrectly linked to consult order resulting in an unresolved order. This non-billable note is being entered purely to resolve that error and the hanging order. Refer to the GI progress note from today for detailed patient evaluation.         Abebe Lewis MD    Gastroenterology    [facility]    Clinical   [facility]

## 2024-02-21 NOTE — PROGRESS NOTES
Ruma Manzanares is a 43 y.o. female on day 3 of admission presenting with Gastrointestinal hemorrhage, unspecified gastrointestinal hemorrhage type.    Subjective   Patient seen asleep in bed. She does not answer questions at baseline. No reported overnight events.     ROS deferred due to altered mental status       Objective     Physical Exam  Gen: Adult female, no acute distress  HEENT: Normocephalic, atraumatic, good dentition, no oral lesions appreciated  Neck: Supple. No cervical lymphadenopathy appreciated, no thyroid enlargement appreciated  CV: Regular rate and rhythm, S1 and S2 present, no murmurs rubs or gallops appreciated  Resp: Lungs clear to auscultation bilaterally, no ronchi, rales or wheezes appreciated  Abdomen: soft, nontender, nondistended, no guarding, no masses appreciated. G-tube present in abdomen, no bleeding appreciated  MSK: No joint swelling appreciated, full ROM  Psych: appropriate mood and affect  Neuro: asleep, no response to voice    Last Recorded Vitals  Blood pressure 111/75, pulse 57, temperature 36.4 °C (97.5 °F), temperature source Temporal, resp. rate 17, height 1.219 m (4'), weight 52.6 kg (115 lb 15.4 oz), SpO2 91 %.  Intake/Output last 3 Shifts:  I/O last 3 completed shifts:  In: 8784.9 (167 mL/kg) [I.V.:1041.2 (19.8 mL/kg); Blood:7643.8; IV Piggyback:100]  Out: 900 (17.1 mL/kg) [Urine:900 (0.5 mL/kg/hr)]  Weight: 52.6 kg       Relevant Results  Scheduled medications  baclofen, 15 mg, g-tube, TID  cefTRIAXone, 1 g, intravenous, q24h  melatonin, 3 mg, oral, Daily  mometasone, 1 puff, inhalation, BID  pantoprazole, 40 mg, intravenous, BID  PHENobarbitaL, 64.8 mg, oral, q AM  PHENobarbitaL, 97.2 mg, oral, Nightly  polyethylene glycol, 17 g, oral, Daily      Continuous medications       PRN medications  PRN medications: bisacodyl, bisacodyl, guaiFENesin, ondansetron **OR** ondansetron    Results for orders placed or performed during the hospital encounter of 02/18/24 (from the past  24 hour(s))   Magnesium   Result Value Ref Range    Magnesium 1.80 1.60 - 2.40 mg/dL   Renal Function Panel   Result Value Ref Range    Glucose 94 74 - 99 mg/dL    Sodium 143 136 - 145 mmol/L    Potassium 3.4 (L) 3.5 - 5.3 mmol/L    Chloride 110 (H) 98 - 107 mmol/L    Bicarbonate 27 21 - 32 mmol/L    Anion Gap 9 (L) 10 - 20 mmol/L    Urea Nitrogen 11 6 - 23 mg/dL    Creatinine 0.29 (L) 0.50 - 1.05 mg/dL    eGFR >90 >60 mL/min/1.73m*2    Calcium 7.8 (L) 8.6 - 10.3 mg/dL    Phosphorus 3.4 2.5 - 4.9 mg/dL    Albumin 2.8 (L) 3.4 - 5.0 g/dL   CBC   Result Value Ref Range    WBC 10.0 4.4 - 11.3 x10*3/uL    nRBC 0.2 (H) 0.0 - 0.0 /100 WBCs    RBC 3.32 (L) 4.00 - 5.20 x10*6/uL    Hemoglobin 10.8 (L) 12.0 - 16.0 g/dL    Hematocrit 32.2 (L) 36.0 - 46.0 %    MCV 97 80 - 100 fL    MCH 32.5 26.0 - 34.0 pg    MCHC 33.5 32.0 - 36.0 g/dL    RDW 15.2 (H) 11.5 - 14.5 %    Platelets 198 150 - 450 x10*3/uL       EGD    Result Date: 2/19/2024  Table formatting from the original result was not included. Impression Single ulcer in the fundus of the stomach with clean base (Sam III) The esophagus and duodenum appeared normal. Findings Single 40 mm large, deep, round ulcer in the fundus of the stomach with clean base (Sam III). Large amount of old blood in the Stomach . No actively bleeding site(s) in stomach, Esophagus or duodenum The esophagus and duodenum appeared normal. Recommendation  Follow up with PCP  Protonix 40mg I.v. Q 12 hours  x 72 hours , then continue I.v.s Repeat EGD : in 2 days for Biopsies Transfuse PRBCs as needed to maintain Hgb > 8.0gm%  Indication Gastrointestinal hemorrhage, unspecified gastrointestinal hemorrhage type Staff Staff Role Efren Mccallum MD Proceduralist Medications See Anesthesia Record. Preprocedure A history and physical has been performed, and patient medication allergies have been reviewed. The patient's tolerance of previous anesthesia has been reviewed. The risks and benefits of the  procedure and the sedation options and risks were discussed with the patient. All questions were answered and informed consent obtained. Details of the Procedure The patient underwent monitored anesthesia care, which was administered by an anesthesia professional. The patient's blood pressure, ECG, ETCO2, heart rate, level of consciousness, oxygen and respirations were monitored throughout the procedure. The scope was introduced through the mouth and advanced to the second part of the duodenum. Retroflexion was performed in the cardia and incisura. Prior to the procedure, the patient's H. Pylori status was unknown. The patient experienced no blood loss. The procedure was not difficult. The patient tolerated the procedure well. There were no apparent adverse events. Events Procedure Events Event Event Time ENDO SCOPE IN TIME 2/19/2024  1:12 PM ENDO SCOPE OUT TIME 2/19/2024  1:21 PM Specimens No specimens collected Procedure Location [address] [address] [phone] Referring Provider No referring provider defined for this encounter. Procedure Provider No name on file     CT angio abdomen pelvis w and or wo IV IV contrast    Result Date: 2/18/2024  Interpreted By:  Mahendra Sethi, STUDY: CT ANGIO ABDOMEN PELVIS W AND/OR WO IV IV CONTRAST;  2/18/2024 7:58 pm   INDICATION: Signs/Symptoms:abdominal distention; blood from G tube. History of Nissen fundoplication.   COMPARISON: 11/7/2006   ACCESSION NUMBER(S): [id number]   ORDERING CLINICIAN: YANNI BOYER   TECHNIQUE: Contiguous axial images of the abdomen and pelvis were obtained with and without intravenous contrast. And sagittal reformatted images were obtained from the axial images. MIPS and 3D reformatted images were also performed and reviewed.   FINDINGS: There is limited evaluation of the lung bases. Bilateral subsegmental atelectasis. No pleural effusion.   Evaluation the abdomen pelvis  is limited secondary to patient motion and beam hardening artifact secondary to extensive thoracolumbar fusion hardware and also inferior position of the patient's arms.   No evidence of liver mass. The gallbladder is present. No dilatation of common bile duct.   The pancreas, spleen, and adrenal glands appear unremarkable.   Limited evaluation of the kidneys secondary to beam hardening artifact. A subcentimeter hypodensity in the left kidney is too small to characterize. No hydronephrosis.   There is postsurgical change of Nissen fundoplication. There is new paraesophageal hiatal hernia with herniation of segment of the stomach extends superiorly along the course of the esophagus, and incompletely imaged superiorly. A PEG tube is noted. There heterogeneity content in the stomach which may correspond to ingested content. No evidence of bowel obstruction. There is large amount of stool throughout the colon. No definite evidence of exaggeration of contrast in the bowel.   No evidence of abdominal aortic aneurysm or dissection. The origins of the celiac artery, superior mesenteric artery, bilateral renal arteries, and the inferior mesenteric artery are patent.   Urinary bladder is underdistended and not well evaluated. There is however evidence of prominent urinary bladder wall thickening.   Limited evaluation of the uterus and adnexa.   Scoliotic curvature and posterior of extensive thoracolumbar fusion. There is also associated fusion of bilateral sacroiliac joints. There is bilateral chronic hip dysplasia.       Postsurgical change of Nissen fundoplication. There is new paraesophageal hiatal hernia which is incompletely imaged superiorly. PEG tube is noted with balloon satisfactorily inflated in the stomach. No evidence of extravasation contrast to suggest acute active gastrointestinal bleed.   Large amount of stool throughout the colon; please correlate for constipation.   Urinary bladder wall thickening; please  correlate with urinalysis for cystitis.   MACRO: None   Signed by: Mahendra Sethi 2/18/2024 9:00 PM Dictation workstation:   [id number]    XR chest 1 view    Result Date: 2/18/2024  Interpreted By:  Carlos Rodgers, STUDY: XR CHEST 1 VIEW;  2/18/2024 7:25 pm   INDICATION: Signs/Symptoms:facility concern for aspiration.   COMPARISON: 09/19/2016   ACCESSION NUMBER(S): [id number]   ORDERING CLINICIAN: YANNI BOYER   FINDINGS: Patient rotation limits evaluation. There is no evidence for focal consolidation. The cardiomediastinal silhouette is prominent and stable.   Redemonstration of long segment thoracic spinal fusion rods with multilevel cerclage wire reinforcement. There is discontinuity of the rods in the lower thoracic/lumbosacral region, 1 of which was present prior study in the other appearing new from prior study.       No evidence of aspiration. New hardware fracture involving the right sided spinal fusion chad and unchanged fracture involving the left spinal fusion chad at the similar level.   MACRO: None.   Signed by: Carlos Rodgers 2/18/2024 7:40 PM Dictation workstation:   [id number]           This patient currently has cardiac telemetry ordered; if you would like to modify or discontinue the telemetry order, click here to go to the orders activity to modify/discontinue the order.                 Assessment/Plan   Principal Problem:    Gastrointestinal hemorrhage, unspecified gastrointestinal hemorrhage type  Active Problems:    Asthma    Cerebral palsy (CMS/HCC)    Dysphagia    Epilepsy (CMS/HCC)    Flexion contractures    GERD (gastroesophageal reflux disease)    Profound intellectual disability    Scoliosis    Spastic quadriparesis secondary to cerebral palsy (CMS/HCC)    Recurrent UTI    Neurogenic bladder    Acute blood loss anemia    Other constipation      ABLA suspected 2/2 UGIB   -Evidence of bleeding in her G tube from facility  -No active extravasation on CTA; however, elevated BUN  (31) compared to baseline with Hgb 15 (about a month ago) --> 10.3 -> 7.3  - s/p 2u pRBC, will monitor CBC and transfuse for goal Hb >8.0  -Lactate OK at 1.7  -Continue to trend H&H  -Continue pantoprazole BID x2 days more then reduce to QD  -Check basic anemia labs (will hold off ferritin because this was 24 within the last month)  -GI consulted  -s/p emergency EGD, as per note large amount of old blood present with large circular ulcer crater with no active bleed.   -plan to repeat EGD tomorrow and biopsy ulcer, then repeat EGD in 3 months. GI spoke to sister for consent. NPO and hold tube feeds after midnight     Constipation / large stool burden, abdominal distention   -1x enema ordered  -Continued on bowel regimen   -Follow abdominal exam   -GI consult appreciated     Leukocytosis   -Presenting with elevated WBCs at 17.2. Lactate 1.7   -Negative CXR for acute process. No obvious infectious etiology on abdominal imaging.   -History of recurrent UTIs with bladder wall thickening on CT --> ordered UA   -Ordered blood cultures x2 out of abundance of caution   -Continue to trend fever curve, WBCs     New fracture of right-sided thoracic spinal hardware; known L sided thoracic spinal hardware fracture   -Surgery was completed in 1988 per records   -Suspect this can likely be followed up in the outpatient setting      Cerebral palsy with spastic quadriplegia  -Confirm and resume home medications  -Resident of [facility]      Seizure disorder   -Patient was given home dose of phenobarb in the ED x1  -Working on confirming the remainder of home meds / updating home med list  -as per patient's outpatient notes it appears she will typically get generalized tonic-clonic seizures 4-6x a year      Profound intellectual disability, baseline nonverbal status  -Resident of [facility] since age 13     Dysphagia s/p G-tube  -NPO, tube feeds as per nutrition. Previously on 900mL nutren w fiber + 2 scoops beneprotein + 2tbsp  nutrisource fiber +1/4tsp salt +H2O = 1750mL divided into 5 feeds of 350mL each via g tube, 30mL water flush after each feed (12am, 6am, 12pm, 4:30pm, 8pm)  -SLP consult  -Nutrition consultation appreciated moving forward     Neurogenic bladder (requires CIC at her facility) with recurrent UTIs  -UA shows 4+ bacteria, likely asymptomatic  -Can continue with straight cath / bladder scans  -continue ceftriaxone     Asthma without acute exacerbation   -Confirm and resume home therapies                 I spent 20 minutes in the professional and overall care of this patient.      Shay Radford MD

## 2024-02-21 NOTE — PROGRESS NOTES
Ruma Manzanares is a 43 y.o. female on day 3 of admission presenting with Gastrointestinal hemorrhage, unspecified gastrointestinal hemorrhage type.    Subjective   Patient seen and examined. She is awake and appears to be resting comfortably. Discussed with RN, no evidence of vomiting, abdominal pain, melena, hematochezia. Was tolerating tube feeds.    10 point ROS unable to be determined due to mental status.       Objective     Physical Exam  Vitals reviewed.   Constitutional:       General: She is not in acute distress.     Appearance: Normal appearance.   Cardiovascular:      Rate and Rhythm: Normal rate and regular rhythm.   Pulmonary:      Effort: Pulmonary effort is normal.      Breath sounds: Normal breath sounds.   Abdominal:      General: Bowel sounds are normal. There is no distension.      Palpations: Abdomen is soft.      Tenderness: There is no abdominal tenderness. There is no guarding or rebound.      Comments: PEG tube in place, dressing dry and intact   Musculoskeletal:      Comments: Contractures present in extremities   Neurological:      Mental Status: She is alert.      Comments: Asleep, unable to assess   Psychiatric:      Comments: Unable to assess     Last Recorded Vitals  Blood pressure 145/89, pulse 62, temperature 36.2 °C (97.1 °F), temperature source Temporal, resp. rate 17, height 1.219 m (4'), weight 52.6 kg (115 lb 15.4 oz), SpO2 97 %.  Intake/Output last 3 Shifts:  I/O last 3 completed shifts:  In: 8784.9 (167 mL/kg) [I.V.:1041.2 (19.8 mL/kg); Blood:7643.8; IV Piggyback:100]  Out: 900 (17.1 mL/kg) [Urine:900 (0.5 mL/kg/hr)]  Weight: 52.6 kg     Relevant Results      Scheduled medications  baclofen, 15 mg, g-tube, TID  cefTRIAXone, 1 g, intravenous, q24h  melatonin, 3 mg, oral, Daily  mometasone, 1 puff, inhalation, BID  pantoprazole, 40 mg, intravenous, BID  PHENobarbitaL, 64.8 mg, oral, q AM  PHENobarbitaL, 97.2 mg, oral, Nightly  polyethylene glycol, 17 g, oral,  Daily      Continuous medications     PRN medications  PRN medications: bisacodyl, bisacodyl, guaiFENesin, ondansetron **OR** ondansetron    Results for orders placed or performed during the hospital encounter of 02/18/24 (from the past 24 hour(s))   POCT GLUCOSE   Result Value Ref Range    POCT Glucose 113 (H) 74 - 99 mg/dL   Magnesium   Result Value Ref Range    Magnesium 1.80 1.60 - 2.40 mg/dL   Renal Function Panel   Result Value Ref Range    Glucose 94 74 - 99 mg/dL    Sodium 143 136 - 145 mmol/L    Potassium 3.4 (L) 3.5 - 5.3 mmol/L    Chloride 110 (H) 98 - 107 mmol/L    Bicarbonate 27 21 - 32 mmol/L    Anion Gap 9 (L) 10 - 20 mmol/L    Urea Nitrogen 11 6 - 23 mg/dL    Creatinine 0.29 (L) 0.50 - 1.05 mg/dL    eGFR >90 >60 mL/min/1.73m*2    Calcium 7.8 (L) 8.6 - 10.3 mg/dL    Phosphorus 3.4 2.5 - 4.9 mg/dL    Albumin 2.8 (L) 3.4 - 5.0 g/dL   CBC   Result Value Ref Range    WBC 10.0 4.4 - 11.3 x10*3/uL    nRBC 0.2 (H) 0.0 - 0.0 /100 WBCs    RBC 3.32 (L) 4.00 - 5.20 x10*6/uL    Hemoglobin 10.8 (L) 12.0 - 16.0 g/dL    Hematocrit 32.2 (L) 36.0 - 46.0 %    MCV 97 80 - 100 fL    MCH 32.5 26.0 - 34.0 pg    MCHC 33.5 32.0 - 36.0 g/dL    RDW 15.2 (H) 11.5 - 14.5 %    Platelets 198 150 - 450 x10*3/uL            Assessment/Plan   Principal Problem:    Gastrointestinal hemorrhage, unspecified gastrointestinal hemorrhage type  Active Problems:    Asthma    Cerebral palsy (CMS/HCC)    Dysphagia    Epilepsy (CMS/HCC)    Flexion contractures    GERD (gastroesophageal reflux disease)    Profound intellectual disability    Scoliosis    Spastic quadriparesis secondary to cerebral palsy (CMS/HCC)    Recurrent UTI    Neurogenic bladder    Acute blood loss anemia    Other constipation    Ruma Manzanares is a 43 year old female with PMH of cerebral palsy with spastic quadriplegia, seizure disorder, profound intellectual disability with nonverbal baseline status, dysphagia s/p G tube, neurogenic bladder with recurrent UTIs, asthma, and  scoliosis presents from her care facility for blood in G tube.      Gastric ulcer  She has a gastrostomy tube in situ for long-term nutritional management.  On admission, she was found to have PEG draining norm blood per the gastrostomy tube with a drop of hemoglobin down to 7.3 g/dL. EGD on 2/19/2024 showed a large cratered ulcer in the gastric fundus. Ulcer was clean based (Sam class III). No evidence of recurrent bleeding.  - Continue BID IV PPI for total of 72 hours, then transition to twice daily PPI orally (via PEG tube)  - Dr. Mccallum recommended repeat EGD in 2-3 days for second look and biopsies, EGD planned for 2/22/2024. Patient's mother (Vianca Manzanares) is agreeable with EGD and can be reached at [phone].  - NPO and stop tube feeds after midnight.  - repeat EGD with Dr. Mccallum in 3 months to check ulcer healing  - monitor H&H and transfuse as needed        Case was reviewed with Dr. Lewis.      Bev Todd PA-C

## 2024-02-22 ENCOUNTER — ANESTHESIA EVENT (OUTPATIENT)
Dept: OPERATING ROOM | Facility: HOSPITAL | Age: 44
DRG: 377 | End: 2024-02-22
Payer: MEDICARE

## 2024-02-22 ENCOUNTER — APPOINTMENT (OUTPATIENT)
Dept: OPERATING ROOM | Facility: HOSPITAL | Age: 44
DRG: 377 | End: 2024-02-22
Payer: MEDICARE

## 2024-02-22 ENCOUNTER — ANESTHESIA (OUTPATIENT)
Dept: OPERATING ROOM | Facility: HOSPITAL | Age: 44
DRG: 377 | End: 2024-02-22
Payer: MEDICARE

## 2024-02-22 PROCEDURE — 2500000001 HC RX 250 WO HCPCS SELF ADMINISTERED DRUGS (ALT 637 FOR MEDICARE OP): Performed by: STUDENT IN AN ORGANIZED HEALTH CARE EDUCATION/TRAINING PROGRAM

## 2024-02-22 PROCEDURE — 7100000002 HC RECOVERY ROOM TIME - EACH INCREMENTAL 1 MINUTE: Performed by: NURSE ANESTHETIST, CERTIFIED REGISTERED

## 2024-02-22 PROCEDURE — 2500000005 HC RX 250 GENERAL PHARMACY W/O HCPCS: Performed by: NURSE ANESTHETIST, CERTIFIED REGISTERED

## 2024-02-22 PROCEDURE — 3700000001 HC GENERAL ANESTHESIA TIME - INITIAL BASE CHARGE: Performed by: NURSE ANESTHETIST, CERTIFIED REGISTERED

## 2024-02-22 PROCEDURE — 43239 EGD BIOPSY SINGLE/MULTIPLE: CPT | Performed by: INTERNAL MEDICINE

## 2024-02-22 PROCEDURE — 2500000004 HC RX 250 GENERAL PHARMACY W/ HCPCS (ALT 636 FOR OP/ED): Performed by: NURSE ANESTHETIST, CERTIFIED REGISTERED

## 2024-02-22 PROCEDURE — C9113 INJ PANTOPRAZOLE SODIUM, VIA: HCPCS | Performed by: STUDENT IN AN ORGANIZED HEALTH CARE EDUCATION/TRAINING PROGRAM

## 2024-02-22 PROCEDURE — 3600000002 HC OR TIME - INITIAL BASE CHARGE - PROCEDURE LEVEL TWO: Performed by: NURSE ANESTHETIST, CERTIFIED REGISTERED

## 2024-02-22 PROCEDURE — 2060000001 HC INTERMEDIATE ICU ROOM DAILY

## 2024-02-22 PROCEDURE — 3700000002 HC GENERAL ANESTHESIA TIME - EACH INCREMENTAL 1 MINUTE: Performed by: NURSE ANESTHETIST, CERTIFIED REGISTERED

## 2024-02-22 PROCEDURE — 7100000009 HC PHASE TWO TIME - INITIAL BASE CHARGE: Performed by: NURSE ANESTHETIST, CERTIFIED REGISTERED

## 2024-02-22 PROCEDURE — 88305 TISSUE EXAM BY PATHOLOGIST: CPT | Performed by: PATHOLOGY

## 2024-02-22 PROCEDURE — 3600000007 HC OR TIME - EACH INCREMENTAL 1 MINUTE - PROCEDURE LEVEL TWO: Performed by: NURSE ANESTHETIST, CERTIFIED REGISTERED

## 2024-02-22 PROCEDURE — 7100000001 HC RECOVERY ROOM TIME - INITIAL BASE CHARGE: Performed by: NURSE ANESTHETIST, CERTIFIED REGISTERED

## 2024-02-22 PROCEDURE — 0DB68ZX EXCISION OF STOMACH, VIA NATURAL OR ARTIFICIAL OPENING ENDOSCOPIC, DIAGNOSTIC: ICD-10-PCS | Performed by: INTERNAL MEDICINE

## 2024-02-22 PROCEDURE — 2500000004 HC RX 250 GENERAL PHARMACY W/ HCPCS (ALT 636 FOR OP/ED): Performed by: ANESTHESIOLOGY

## 2024-02-22 PROCEDURE — 99233 SBSQ HOSP IP/OBS HIGH 50: CPT | Performed by: FAMILY MEDICINE

## 2024-02-22 PROCEDURE — 88305 TISSUE EXAM BY PATHOLOGIST: CPT | Mod: TC,PORLAB | Performed by: INTERNAL MEDICINE

## 2024-02-22 PROCEDURE — 2500000004 HC RX 250 GENERAL PHARMACY W/ HCPCS (ALT 636 FOR OP/ED): Performed by: INTERNAL MEDICINE

## 2024-02-22 PROCEDURE — 88342 IMHCHEM/IMCYTCHM 1ST ANTB: CPT | Performed by: PATHOLOGY

## 2024-02-22 PROCEDURE — 2500000004 HC RX 250 GENERAL PHARMACY W/ HCPCS (ALT 636 FOR OP/ED): Performed by: STUDENT IN AN ORGANIZED HEALTH CARE EDUCATION/TRAINING PROGRAM

## 2024-02-22 RX ORDER — SODIUM CHLORIDE, SODIUM LACTATE, POTASSIUM CHLORIDE, CALCIUM CHLORIDE 600; 310; 30; 20 MG/100ML; MG/100ML; MG/100ML; MG/100ML
100 INJECTION, SOLUTION INTRAVENOUS CONTINUOUS
Status: DISCONTINUED | OUTPATIENT
Start: 2024-02-22 | End: 2024-02-23 | Stop reason: HOSPADM

## 2024-02-22 RX ORDER — PROPOFOL 10 MG/ML
INJECTION, EMULSION INTRAVENOUS AS NEEDED
Status: DISCONTINUED | OUTPATIENT
Start: 2024-02-22 | End: 2024-02-22

## 2024-02-22 RX ORDER — LIDOCAINE HYDROCHLORIDE 10 MG/ML
0.1 INJECTION, SOLUTION EPIDURAL; INFILTRATION; INTRACAUDAL; PERINEURAL ONCE
Status: DISCONTINUED | OUTPATIENT
Start: 2024-02-22 | End: 2024-02-22 | Stop reason: HOSPADM

## 2024-02-22 RX ORDER — SODIUM CHLORIDE, SODIUM LACTATE, POTASSIUM CHLORIDE, CALCIUM CHLORIDE 600; 310; 30; 20 MG/100ML; MG/100ML; MG/100ML; MG/100ML
100 INJECTION, SOLUTION INTRAVENOUS CONTINUOUS
Status: DISCONTINUED | OUTPATIENT
Start: 2024-02-22 | End: 2024-02-22 | Stop reason: HOSPADM

## 2024-02-22 RX ORDER — OXYCODONE HYDROCHLORIDE 5 MG/1
5 TABLET ORAL EVERY 4 HOURS PRN
Status: CANCELLED | OUTPATIENT
Start: 2024-02-22

## 2024-02-22 RX ORDER — MORPHINE SULFATE 2 MG/ML
1 INJECTION, SOLUTION INTRAMUSCULAR; INTRAVENOUS EVERY 5 MIN PRN
Status: CANCELLED | OUTPATIENT
Start: 2024-02-22

## 2024-02-22 RX ORDER — SODIUM CHLORIDE, SODIUM LACTATE, POTASSIUM CHLORIDE, CALCIUM CHLORIDE 600; 310; 30; 20 MG/100ML; MG/100ML; MG/100ML; MG/100ML
100 INJECTION, SOLUTION INTRAVENOUS CONTINUOUS
Status: CANCELLED | OUTPATIENT
Start: 2024-02-22

## 2024-02-22 RX ORDER — ONDANSETRON HYDROCHLORIDE 2 MG/ML
4 INJECTION, SOLUTION INTRAVENOUS ONCE AS NEEDED
Status: DISCONTINUED | OUTPATIENT
Start: 2024-02-22 | End: 2024-02-22 | Stop reason: HOSPADM

## 2024-02-22 RX ORDER — FAMOTIDINE 10 MG/ML
20 INJECTION INTRAVENOUS ONCE
Status: COMPLETED | OUTPATIENT
Start: 2024-02-22 | End: 2024-02-22

## 2024-02-22 RX ORDER — ONDANSETRON HYDROCHLORIDE 2 MG/ML
4 INJECTION, SOLUTION INTRAVENOUS ONCE AS NEEDED
Status: CANCELLED | OUTPATIENT
Start: 2024-02-22

## 2024-02-22 RX ORDER — FAMOTIDINE 10 MG/ML
20 INJECTION INTRAVENOUS ONCE
Status: CANCELLED | OUTPATIENT
Start: 2024-02-22 | End: 2024-02-22

## 2024-02-22 RX ORDER — LIDOCAINE HYDROCHLORIDE 10 MG/ML
0.1 INJECTION, SOLUTION EPIDURAL; INFILTRATION; INTRACAUDAL; PERINEURAL ONCE
Status: CANCELLED | OUTPATIENT
Start: 2024-02-22 | End: 2024-02-22

## 2024-02-22 RX ORDER — MORPHINE SULFATE 2 MG/ML
1 INJECTION, SOLUTION INTRAMUSCULAR; INTRAVENOUS EVERY 5 MIN PRN
Status: DISCONTINUED | OUTPATIENT
Start: 2024-02-22 | End: 2024-02-22 | Stop reason: HOSPADM

## 2024-02-22 RX ORDER — OXYCODONE HYDROCHLORIDE 5 MG/1
5 TABLET ORAL EVERY 4 HOURS PRN
Status: DISCONTINUED | OUTPATIENT
Start: 2024-02-22 | End: 2024-02-22 | Stop reason: HOSPADM

## 2024-02-22 RX ORDER — FENTANYL CITRATE 50 UG/ML
INJECTION, SOLUTION INTRAMUSCULAR; INTRAVENOUS AS NEEDED
Status: DISCONTINUED | OUTPATIENT
Start: 2024-02-22 | End: 2024-02-22

## 2024-02-22 RX ORDER — GLYCOPYRROLATE 0.2 MG/ML
INJECTION INTRAMUSCULAR; INTRAVENOUS AS NEEDED
Status: DISCONTINUED | OUTPATIENT
Start: 2024-02-22 | End: 2024-02-22

## 2024-02-22 RX ORDER — LIDOCAINE HCL/PF 100 MG/5ML
SYRINGE (ML) INTRAVENOUS AS NEEDED
Status: DISCONTINUED | OUTPATIENT
Start: 2024-02-22 | End: 2024-02-22

## 2024-02-22 RX ADMIN — BACLOFEN 15 MG: 10 TABLET ORAL at 15:23

## 2024-02-22 RX ADMIN — MOMETASONE FUROATE 1 PUFF: 220 INHALANT RESPIRATORY (INHALATION) at 18:20

## 2024-02-22 RX ADMIN — LIDOCAINE HYDROCHLORIDE 60 MG: 20 INJECTION, SOLUTION INTRAVENOUS at 13:06

## 2024-02-22 RX ADMIN — BACLOFEN 15 MG: 10 TABLET ORAL at 20:14

## 2024-02-22 RX ADMIN — FAMOTIDINE 20 MG: 10 INJECTION, SOLUTION INTRAVENOUS at 12:34

## 2024-02-22 RX ADMIN — CEFTRIAXONE SODIUM 1 G: 1 INJECTION, SOLUTION INTRAVENOUS at 04:46

## 2024-02-22 RX ADMIN — PROPOFOL 50 MG: 10 INJECTION, EMULSION INTRAVENOUS at 13:06

## 2024-02-22 RX ADMIN — PANTOPRAZOLE SODIUM 40 MG: 40 INJECTION, POWDER, FOR SOLUTION INTRAVENOUS at 20:15

## 2024-02-22 RX ADMIN — PHENOBARBITAL 64.8 MG: 32.4 TABLET ORAL at 09:43

## 2024-02-22 RX ADMIN — GLYCOPYRROLATE 0.2 MG: 0.2 INJECTION INTRAMUSCULAR; INTRAVENOUS at 13:15

## 2024-02-22 RX ADMIN — Medication 3 MG: at 20:16

## 2024-02-22 RX ADMIN — BACLOFEN 15 MG: 10 TABLET ORAL at 09:43

## 2024-02-22 RX ADMIN — PHENOBARBITAL 97.2 MG: 32.4 TABLET ORAL at 20:14

## 2024-02-22 RX ADMIN — FENTANYL CITRATE 25 MCG: 50 INJECTION INTRAMUSCULAR; INTRAVENOUS at 13:06

## 2024-02-22 RX ADMIN — MOMETASONE FUROATE 1 PUFF: 220 INHALANT RESPIRATORY (INHALATION) at 06:20

## 2024-02-22 RX ADMIN — PANTOPRAZOLE SODIUM 40 MG: 40 INJECTION, POWDER, FOR SOLUTION INTRAVENOUS at 09:43

## 2024-02-22 RX ADMIN — SODIUM CHLORIDE, POTASSIUM CHLORIDE, SODIUM LACTATE AND CALCIUM CHLORIDE 100 ML/HR: 600; 310; 30; 20 INJECTION, SOLUTION INTRAVENOUS at 12:33

## 2024-02-22 SDOH — HEALTH STABILITY: MENTAL HEALTH: CURRENT SMOKER: 0

## 2024-02-22 ASSESSMENT — PAIN SCALES - PAIN ASSESSMENT IN ADVANCED DEMENTIA (PAINAD)
BODYLANGUAGE: RELAXED
BREATHING: NORMAL

## 2024-02-22 ASSESSMENT — COGNITIVE AND FUNCTIONAL STATUS - GENERAL
MOBILITY SCORE: 6
STANDING UP FROM CHAIR USING ARMS: TOTAL
CLIMB 3 TO 5 STEPS WITH RAILING: TOTAL
WALKING IN HOSPITAL ROOM: TOTAL
MOVING FROM LYING ON BACK TO SITTING ON SIDE OF FLAT BED WITH BEDRAILS: TOTAL
MOVING TO AND FROM BED TO CHAIR: TOTAL
TURNING FROM BACK TO SIDE WHILE IN FLAT BAD: TOTAL

## 2024-02-22 ASSESSMENT — PAIN - FUNCTIONAL ASSESSMENT: PAIN_FUNCTIONAL_ASSESSMENT: WONG-BAKER FACES

## 2024-02-22 ASSESSMENT — PAIN SCALES - WONG BAKER
WONGBAKER_NUMERICALRESPONSE: NO HURT

## 2024-02-22 ASSESSMENT — PAIN SCALES - GENERAL: PAIN_LEVEL: 0

## 2024-02-22 NOTE — ANESTHESIA PREPROCEDURE EVALUATION
Patient: Ruma Manzanares    Procedure Information       Date/Time: 02/22/24 1300    Scheduled providers: Abebe Lewis MD; Prosper Perales RN    Procedure: EGD    Location: [facility] OR            Relevant Problems   GI   (+) GERD (gastroesophageal reflux disease)   (+) Gastrointestinal hemorrhage, unspecified gastrointestinal hemorrhage type      /Renal   (+) Recurrent UTI      Neuro/Psych   (+) Epilepsy (CMS/HCC)      Pulmonary   (+) Asthma      Hematology   (+) Acute blood loss anemia      Musculoskeletal   (+) Scoliosis      Infectious Disease   (+) Recurrent UTI       Clinical information reviewed:   Tobacco  Allergies  Meds  Problems  Med Hx  Surg Hx   Fam Hx  Soc   Hx        NPO Detail:  No data recorded     Physical Exam    Airway  Mallampati: unable to assess  TM distance: <3 FB  Neck ROM: limited     Cardiovascular - normal exam  Rhythm: regular  Rate: normal     Dental - normal exam     Pulmonary - normal exam     Abdominal - normal exam           Anesthesia Plan    History of general anesthesia?: yes  History of complications of general anesthesia?: no    ASA 3     MAC     The patient is not a current smoker.    intravenous induction   Postoperative administration of opioids is intended.    Plan discussed with CRNA.

## 2024-02-22 NOTE — PROGRESS NOTES
Spoke with patient's mother regarding status update, physician to contact [facility] and discuss when patient will be discharged back to her home. Mother asks that we keep her updated.

## 2024-02-22 NOTE — PROGRESS NOTES
"Nutrition Follow Up Assessment:   Nutrition Assessment         Medical history per chart:   43 y.o. female with PMHx s/f cerebral palsy with spastic quadriplegia, seizure disorder, profound intellectual disability, baseline nonverbal status, dysphagia s/p G-tube, neurogenic bladder (requires CIC at her facility) with recurrent UTIs, asthma (no baseline O2), GERD, scoliosis, history of posterior thoracic vertebral fusion (1988), who presents from her care facility for evaluation of blood in G-tube, abdominal distention, and increased WOB.       2/22:  Pt out of room for Upper Endoscopy.  Noted okay to resume Tube feeds after per GI notes.  TF was started on 2/20, but pt was NPO since midnight for testing.  Will continue to follow for tolerance, and advancement.    2/19:  Pt non-verbal, and hx of Peg on TF.  Pt from [facility] and noted previous TF orders.  Pt is s/p Upper GI endoscopy for bleeding out of Peg.  Per GI - Dr. Mccallum - TF might be able to start tomorrow.  Will follow.     Nutrition History:  Food and Nutrient History: Diet per [facility] noted: 900 ml Nutren with fiber plus 2 scoops Beneprotein + 1 tsp salt + free water to equal 1750 ml, then divided into 5 feeds of 350 ml       Current Diet: No diet orders on file    Nutrition Related Findings:     BM: Last BM Date: 02/19/24  Wound Type: none (nursing/wound notes provide further details)    Food allergies: NKFA. has No Known Allergies.  Meds/Labs reviewed.      Nutrition Significant Labs:    Results from last 7 days   Lab Units 02/21/24  0349 02/18/24  1908   GLUCOSE mg/dL 94 149*   SODIUM mmol/L 143 137   POTASSIUM mmol/L 3.4* 3.8   CHLORIDE mmol/L 110* 103   CO2 mmol/L 27 28   BUN mg/dL 11 31*   CREATININE mg/dL 0.29* 0.33*   EGFR mL/min/1.73m*2 >90 >90   CALCIUM mg/dL 7.8* 8.1*   PHOSPHORUS mg/dL 3.4  --    MAGNESIUM mg/dL 1.80  --      No results found for: \"HGBA1C\"  Results from last 7 days   Lab Units 02/20/24  1123   POCT GLUCOSE mg/dL 113* "       Anthropometrics:  Height: 121.9 cm (4')   Weight: 51.8 kg (114 lb 3.2 oz)   BMI (Calculated): 34.86  IBW/kg (Dietitian Calculated): 41 kg          Weight History:   Wt Readings from Last 10 Encounters:   02/22/24 51.8 kg (114 lb 3.2 oz)        Weight Change %:  Weight History / % Weight Change: Per [facility] UBW is 45 kg (99#)  Significant Weight Loss: No          Nutrition Focused Physical Exam Findings:  defer: Out of room for testing  Subcutaneous Fat Loss:      Muscle Wasting:     Edema:     Physical Findings:  Skin: Negative    Estimated Needs:      Method for Estimating Needs: 7054-8104 kcals (25-30 kcal/kg)     Method for Estimating Needs: 43-52 gm (1-1.2 gm/kg)     Method for Estimating Needs: 1ml/kcal        Nutrition Diagnosis   Nutrition Diagnosis:  Malnutrition Diagnosis  Patient has Malnutrition Diagnosis: No    Nutrition Diagnosis  Patient has Nutrition Diagnosis: Yes  Diagnosis Status (1): Ongoing  Nutrition Diagnosis 1: Inadequate oral intake  Related to (1): GI function, bleeding from Peg tube  As Evidenced by (1): NPO status with need of Enteral nutrition       Nutrition Interventions/Recommendations   Nutrition Interventions and Recommendations:        Nutrition Prescription:  Individualized Nutrition Prescription Provided for : Cotninue with Nutren with Fiber @ goal of 45 ml/hr: 1080 kcals, 43 gm protein, 905 ml Free water.        Nutrition Interventions:   Food and/or Nutrient Delivery Interventions  Interventions: Enteral intake  Enteral Intake: Other (Comment)  Goal: Free water flush of 100 ml Q6H to provide with TF: 1305 ml free water    Coordination of Nutrition Care by a Nutrition Professional  Collaboration and Referral of Nutrition Care: Other (Comment)  Goal: not available    Nutrition Education:   Education Documentation  No documentation found.      Nutrition Counseling  Counseling Theoretical Approach: Other (Comment)  Goal: pt unable       Nutrition Monitoring and  Evaluation   Monitoring/Evaluation:   Food/Nutrient Related History Monitoring  Monitoring and Evaluation Plan: Enteral and parenteral nutrition intake  Enteral and Parenteral Nutrition Intake: Enteral nutrition formula/solution, Enteral nutrition intake  Criteria: TF advancement, and tolerance    Body Composition/Growth/Weight History  Monitoring and Evaluation Plan: Weight  Weight: Measured weight  Criteria: Stable weight    Biochemical Data, Medical Tests and Procedures  Monitoring and Evaluation Plan: Electrolyte/renal panel, Glucose/endocrine profile  Electrolyte and Renal Panel: BUN, Creatinine, Phosphorus, Potassium, Sodium  Criteria: WNL  Glucose/Endocrine Profile: Glucose, casual, Glucose, fasting  Criteria: WNL    Nutrition Focused Physical Findings  Monitoring and Evaluation Plan: Skin  Skin: Other (Comment)  Criteria: Intact            Time Spent/Follow-up Reminder:   Follow Up  Time Spent (min): 35 minutes  Last Date of Nutrition Visit: 02/22/24  Nutrition Follow-Up Needed?: Dietitian to reassess per policy  Follow up Comment: 2/26

## 2024-02-22 NOTE — CARE PLAN
Problem: Pain  Goal: My pain/discomfort is manageable  Outcome: Progressing     Problem: Safety  Goal: Patient will be injury free during hospitalization  Outcome: Progressing  Goal: I will remain free of falls  Outcome: Progressing     Problem: Daily Care  Goal: Daily care needs are met  Outcome: Progressing     Problem: Psychosocial Needs  Goal: Demonstrates ability to cope with hospitalization/illness  Outcome: Progressing  Goal: Collaborate with me, my family, and caregiver to identify my specific goals  Outcome: Progressing     Problem: Discharge Barriers  Goal: My discharge needs are met  Outcome: Progressing     Problem: Skin  Goal: Decreased wound size/increased tissue granulation at next dressing change  Outcome: Progressing  Goal: Participates in plan/prevention/treatment measures  Outcome: Progressing  Goal: Prevent/manage excess moisture  Outcome: Progressing  Goal: Prevent/minimize sheer/friction injuries  Outcome: Progressing  Goal: Promote/optimize nutrition  Outcome: Progressing  Goal: Promote skin healing  Outcome: Progressing     Problem: Nutrition  Goal: Less than 5 days NPO/clear liquids  Outcome: Progressing  Goal: Nutrition support goals are met within 48 hrs  Outcome: Progressing  Goal: Tube feed tolerance  Outcome: Progressing  Goal: BG  mg/dL  Outcome: Progressing  Goal: Maintain stable weight  Outcome: Progressing     Problem: Pain - Adult  Goal: Verbalizes/displays adequate comfort level or baseline comfort level  Outcome: Progressing     Problem: Safety - Adult  Goal: Free from fall injury  Outcome: Progressing     Problem: Discharge Planning  Goal: Discharge to home or other facility with appropriate resources  Outcome: Progressing     Problem: Chronic Conditions and Co-morbidities  Goal: Patient's chronic conditions and co-morbidity symptoms are monitored and maintained or improved  Outcome: Progressing

## 2024-02-22 NOTE — ANESTHESIA POSTPROCEDURE EVALUATION
Patient: Ruma Manzanares    Procedure Summary       Date: 02/22/24 Room / Location: [facility] OR    Anesthesia Start: 1302 Anesthesia Stop: 1329    Procedure: EGD Diagnosis: Gastrointestinal hemorrhage, unspecified gastrointestinal hemorrhage type    Scheduled Providers: Abebe Lewis MD; Prosper Perales RN Responsible Provider: JAMES Collier    Anesthesia Type: MAC ASA Status: 3            Anesthesia Type: MAC    Vitals Value Taken Time   /68 02/22/24 1412   Temp 36.3 °C (97.3 °F) 02/22/24 1350   Pulse 46 02/22/24 1410   Resp 15 02/22/24 1410   SpO2 92 % 02/22/24 1412   Vitals shown include unvalidated device data.    Anesthesia Post Evaluation    Patient location during evaluation: PACU  Patient participation: complete - patient participated  Level of consciousness: awake  Pain score: 0  Pain management: adequate  Airway patency: patent  Cardiovascular status: acceptable  Respiratory status: acceptable  Hydration status: acceptable  Postoperative Nausea and Vomiting: none    No notable events documented.

## 2024-02-22 NOTE — POST-PROCEDURE NOTE
Upper endoscopy (EGD) completed. Full report in EPIC.      EGD revealed a normal esophagus and normal duodenum. In the stomach there was evidence of a previously placed PEG tube as well as gastric erythema. The previously identified ulcer was examined and found to be clean based (Sam Class III). Biopsies were obtained.      Recommendations:  - resume tube feeds as tolerated  - pathology pending  - Continue BID IV PPI for total of 72 hours (from admission), then transition to twice daily PPI orally (via PEG tube)  - Repeat EGD with Dr. Mccallum in 3 months to check ulcer healing, follow up in GI clinic with Dr. Mccallum or GI APPs to arrange. Call [phone] to schedule.  - monitor H&H and transfuse as needed        Abebe Lewis MD    Gastroenterology    [facility]    Clinical   [facility]

## 2024-02-23 VITALS
OXYGEN SATURATION: 92 % | DIASTOLIC BLOOD PRESSURE: 70 MMHG | SYSTOLIC BLOOD PRESSURE: 114 MMHG | TEMPERATURE: 97.6 F | HEART RATE: 65 BPM | RESPIRATION RATE: 16 BRPM | WEIGHT: 108.69 LBS | BODY MASS INDEX: 25.15 KG/M2 | HEIGHT: 55 IN

## 2024-02-23 LAB
ALBUMIN SERPL BCP-MCNC: 3.1 G/DL (ref 3.4–5)
ANION GAP SERPL CALC-SCNC: 10 MMOL/L (ref 10–20)
BACTERIA BLD CULT: NORMAL
BACTERIA BLD CULT: NORMAL
BUN SERPL-MCNC: 7 MG/DL (ref 6–23)
CALCIUM SERPL-MCNC: 8.1 MG/DL (ref 8.6–10.3)
CHLORIDE SERPL-SCNC: 105 MMOL/L (ref 98–107)
CO2 SERPL-SCNC: 25 MMOL/L (ref 21–32)
CREAT SERPL-MCNC: 0.31 MG/DL (ref 0.5–1.05)
EGFRCR SERPLBLD CKD-EPI 2021: >90 ML/MIN/1.73M*2
ERYTHROCYTE [DISTWIDTH] IN BLOOD BY AUTOMATED COUNT: 14.8 % (ref 11.5–14.5)
GLUCOSE SERPL-MCNC: 119 MG/DL (ref 74–99)
HCT VFR BLD AUTO: 36.3 % (ref 36–46)
HGB BLD-MCNC: 12.2 G/DL (ref 12–16)
MAGNESIUM SERPL-MCNC: 2.05 MG/DL (ref 1.6–2.4)
MCH RBC QN AUTO: 32.3 PG (ref 26–34)
MCHC RBC AUTO-ENTMCNC: 33.6 G/DL (ref 32–36)
MCV RBC AUTO: 96 FL (ref 80–100)
NRBC BLD-RTO: 0 /100 WBCS (ref 0–0)
PHOSPHATE SERPL-MCNC: 3.3 MG/DL (ref 2.5–4.9)
PLATELET # BLD AUTO: 285 X10*3/UL (ref 150–450)
POTASSIUM SERPL-SCNC: 3.1 MMOL/L (ref 3.5–5.3)
RBC # BLD AUTO: 3.78 X10*6/UL (ref 4–5.2)
SODIUM SERPL-SCNC: 137 MMOL/L (ref 136–145)
WBC # BLD AUTO: 7.6 X10*3/UL (ref 4.4–11.3)

## 2024-02-23 PROCEDURE — 2500000001 HC RX 250 WO HCPCS SELF ADMINISTERED DRUGS (ALT 637 FOR MEDICARE OP): Performed by: STUDENT IN AN ORGANIZED HEALTH CARE EDUCATION/TRAINING PROGRAM

## 2024-02-23 PROCEDURE — 2500000004 HC RX 250 GENERAL PHARMACY W/ HCPCS (ALT 636 FOR OP/ED): Performed by: STUDENT IN AN ORGANIZED HEALTH CARE EDUCATION/TRAINING PROGRAM

## 2024-02-23 PROCEDURE — 2500000004 HC RX 250 GENERAL PHARMACY W/ HCPCS (ALT 636 FOR OP/ED): Performed by: ANESTHESIOLOGY

## 2024-02-23 PROCEDURE — 99232 SBSQ HOSP IP/OBS MODERATE 35: CPT | Performed by: PHYSICIAN ASSISTANT

## 2024-02-23 PROCEDURE — 83735 ASSAY OF MAGNESIUM: CPT | Performed by: FAMILY MEDICINE

## 2024-02-23 PROCEDURE — 85027 COMPLETE CBC AUTOMATED: CPT | Performed by: FAMILY MEDICINE

## 2024-02-23 PROCEDURE — 36415 COLL VENOUS BLD VENIPUNCTURE: CPT | Performed by: FAMILY MEDICINE

## 2024-02-23 PROCEDURE — C9113 INJ PANTOPRAZOLE SODIUM, VIA: HCPCS | Performed by: STUDENT IN AN ORGANIZED HEALTH CARE EDUCATION/TRAINING PROGRAM

## 2024-02-23 PROCEDURE — 99239 HOSP IP/OBS DSCHRG MGMT >30: CPT | Performed by: FAMILY MEDICINE

## 2024-02-23 PROCEDURE — 2500000004 HC RX 250 GENERAL PHARMACY W/ HCPCS (ALT 636 FOR OP/ED): Performed by: INTERNAL MEDICINE

## 2024-02-23 PROCEDURE — 2500000001 HC RX 250 WO HCPCS SELF ADMINISTERED DRUGS (ALT 637 FOR MEDICARE OP): Performed by: FAMILY MEDICINE

## 2024-02-23 PROCEDURE — 80069 RENAL FUNCTION PANEL: CPT | Performed by: FAMILY MEDICINE

## 2024-02-23 RX ORDER — POLYETHYLENE GLYCOL 3350 17 G/17G
POWDER, FOR SOLUTION ORAL
Start: 2024-02-23

## 2024-02-23 RX ORDER — PANTOPRAZOLE SODIUM 40 MG/1
40 FOR SUSPENSION ORAL
Qty: 180 PACKET | Refills: 0 | Status: SHIPPED | OUTPATIENT
Start: 2024-02-23 | End: 2024-05-23

## 2024-02-23 RX ORDER — POTASSIUM CHLORIDE 1.5 G/1.58G
40 POWDER, FOR SOLUTION ORAL 3 TIMES DAILY
Status: DISCONTINUED | OUTPATIENT
Start: 2024-02-23 | End: 2024-02-23 | Stop reason: HOSPADM

## 2024-02-23 RX ADMIN — POTASSIUM CHLORIDE 40 MEQ: 1.5 POWDER, FOR SOLUTION ORAL at 09:10

## 2024-02-23 RX ADMIN — PANTOPRAZOLE SODIUM 40 MG: 40 INJECTION, POWDER, FOR SOLUTION INTRAVENOUS at 09:12

## 2024-02-23 RX ADMIN — SODIUM CHLORIDE, POTASSIUM CHLORIDE, SODIUM LACTATE AND CALCIUM CHLORIDE 100 ML/HR: 600; 310; 30; 20 INJECTION, SOLUTION INTRAVENOUS at 07:48

## 2024-02-23 RX ADMIN — BACLOFEN 15 MG: 10 TABLET ORAL at 09:10

## 2024-02-23 RX ADMIN — PHENOBARBITAL 64.8 MG: 32.4 TABLET ORAL at 09:09

## 2024-02-23 RX ADMIN — POLYETHYLENE GLYCOL 3350 17 G: 17 POWDER, FOR SOLUTION ORAL at 09:10

## 2024-02-23 RX ADMIN — CEFTRIAXONE SODIUM 1 G: 1 INJECTION, SOLUTION INTRAVENOUS at 04:41

## 2024-02-23 ASSESSMENT — COGNITIVE AND FUNCTIONAL STATUS - GENERAL
CLIMB 3 TO 5 STEPS WITH RAILING: TOTAL
MOVING TO AND FROM BED TO CHAIR: TOTAL
EATING MEALS: TOTAL
STANDING UP FROM CHAIR USING ARMS: TOTAL
HELP NEEDED FOR BATHING: TOTAL
DRESSING REGULAR LOWER BODY CLOTHING: TOTAL
TOILETING: TOTAL
DRESSING REGULAR UPPER BODY CLOTHING: TOTAL
PERSONAL GROOMING: TOTAL
TURNING FROM BACK TO SIDE WHILE IN FLAT BAD: TOTAL
DAILY ACTIVITIY SCORE: 6
MOVING FROM LYING ON BACK TO SITTING ON SIDE OF FLAT BED WITH BEDRAILS: TOTAL
MOBILITY SCORE: 6
WALKING IN HOSPITAL ROOM: TOTAL

## 2024-02-23 ASSESSMENT — PAIN SCALES - GENERAL: PAINLEVEL_OUTOF10: 0 - NO PAIN

## 2024-02-23 NOTE — PROGRESS NOTES
Ruma Manzanares is a 43 y.o. female on day 4 of admission presenting with Gastrointestinal hemorrhage, unspecified gastrointestinal hemorrhage type.    Subjective   Patient seen awake in bed. She does not answer questions at baseline. Patient underwent EGD today. No reported overnight events.     ROS deferred due to altered mental status       Objective     Physical Exam  Gen: Adult female, no acute distress  HEENT: Normocephalic, atraumatic, good dentition, no oral lesions appreciated  Neck: Supple. No cervical lymphadenopathy appreciated, no thyroid enlargement appreciated  CV: Regular rate and rhythm, S1 and S2 present, no murmurs rubs or gallops appreciated  Resp: Lungs clear to auscultation bilaterally, no ronchi, rales or wheezes appreciated  Abdomen: soft, nontender, nondistended, no guarding, no masses appreciated. G-tube present in abdomen, no bleeding appreciated  MSK: No joint swelling appreciated, full ROM  Psych: appropriate mood and affect  Neuro: awake, not moving, no response to voice    Last Recorded Vitals  Blood pressure 109/66, pulse 56, temperature 36.6 °C (97.9 °F), temperature source Temporal, resp. rate 18, height 1.219 m (4'), weight 51.8 kg (114 lb 3.2 oz), SpO2 92 %.  Intake/Output last 3 Shifts:  I/O last 3 completed shifts:  In: 250 (4.8 mL/kg) [I.V.:250 (4.8 mL/kg)]  Out: 1000 (19.3 mL/kg) [Urine:1000 (0.5 mL/kg/hr)]  Weight: 51.8 kg       Relevant Results  Scheduled medications  baclofen, 15 mg, g-tube, TID  cefTRIAXone, 1 g, intravenous, q24h  melatonin, 3 mg, oral, Daily  mometasone, 1 puff, inhalation, BID  pantoprazole, 40 mg, intravenous, BID  PHENobarbitaL, 64.8 mg, oral, q AM  PHENobarbitaL, 97.2 mg, oral, Nightly  polyethylene glycol, 17 g, oral, Daily      Continuous medications  lactated Ringer's, 100 mL/hr, Last Rate: Stopped (02/22/24 1300)      PRN medications  PRN medications: bisacodyl, bisacodyl, guaiFENesin, ondansetron **OR** ondansetron    No results found for this or  any previous visit (from the past 24 hour(s)).      EGD    Result Date: 2/19/2024  Table formatting from the original result was not included. Impression Single ulcer in the fundus of the stomach with clean base (Sam III) The esophagus and duodenum appeared normal. Findings Single 40 mm large, deep, round ulcer in the fundus of the stomach with clean base (Sam III). Large amount of old blood in the Stomach . No actively bleeding site(s) in stomach, Esophagus or duodenum The esophagus and duodenum appeared normal. Recommendation  Follow up with PCP  Protonix 40mg I.v. Q 12 hours  x 72 hours , then continue I.v.s Repeat EGD : in 2 days for Biopsies Transfuse PRBCs as needed to maintain Hgb > 8.0gm%  Indication Gastrointestinal hemorrhage, unspecified gastrointestinal hemorrhage type Staff Staff Role Efren Mccallum MD Proceduralist Medications See Anesthesia Record. Preprocedure A history and physical has been performed, and patient medication allergies have been reviewed. The patient's tolerance of previous anesthesia has been reviewed. The risks and benefits of the procedure and the sedation options and risks were discussed with the patient. All questions were answered and informed consent obtained. Details of the Procedure The patient underwent monitored anesthesia care, which was administered by an anesthesia professional. The patient's blood pressure, ECG, ETCO2, heart rate, level of consciousness, oxygen and respirations were monitored throughout the procedure. The scope was introduced through the mouth and advanced to the second part of the duodenum. Retroflexion was performed in the cardia and incisura. Prior to the procedure, the patient's H. Pylori status was unknown. The patient experienced no blood loss. The procedure was not difficult. The patient tolerated the procedure well. There were no apparent adverse events. Events Procedure Events Event Event Time ENDO SCOPE IN TIME 2/19/2024  1:12 PM  ENDO SCOPE OUT TIME 2/19/2024  1:21 PM Specimens No specimens collected Procedure Location [address] [address] [phone] Referring Provider No referring provider defined for this encounter. Procedure Provider No name on file     CT angio abdomen pelvis w and or wo IV IV contrast    Result Date: 2/18/2024  Interpreted By:  Mahendra Sethi, STUDY: CT ANGIO ABDOMEN PELVIS W AND/OR WO IV IV CONTRAST;  2/18/2024 7:58 pm   INDICATION: Signs/Symptoms:abdominal distention; blood from G tube. History of Nissen fundoplication.   COMPARISON: 11/7/2006   ACCESSION NUMBER(S): [id number]   ORDERING CLINICIAN: YANNI BOYER   TECHNIQUE: Contiguous axial images of the abdomen and pelvis were obtained with and without intravenous contrast. And sagittal reformatted images were obtained from the axial images. MIPS and 3D reformatted images were also performed and reviewed.   FINDINGS: There is limited evaluation of the lung bases. Bilateral subsegmental atelectasis. No pleural effusion.   Evaluation the abdomen pelvis is limited secondary to patient motion and beam hardening artifact secondary to extensive thoracolumbar fusion hardware and also inferior position of the patient's arms.   No evidence of liver mass. The gallbladder is present. No dilatation of common bile duct.   The pancreas, spleen, and adrenal glands appear unremarkable.   Limited evaluation of the kidneys secondary to beam hardening artifact. A subcentimeter hypodensity in the left kidney is too small to characterize. No hydronephrosis.   There is postsurgical change of Nissen fundoplication. There is new paraesophageal hiatal hernia with herniation of segment of the stomach extends superiorly along the course of the esophagus, and incompletely imaged superiorly. A PEG tube is noted. There heterogeneity content in the stomach which may correspond to ingested content. No evidence of bowel  obstruction. There is large amount of stool throughout the colon. No definite evidence of exaggeration of contrast in the bowel.   No evidence of abdominal aortic aneurysm or dissection. The origins of the celiac artery, superior mesenteric artery, bilateral renal arteries, and the inferior mesenteric artery are patent.   Urinary bladder is underdistended and not well evaluated. There is however evidence of prominent urinary bladder wall thickening.   Limited evaluation of the uterus and adnexa.   Scoliotic curvature and posterior of extensive thoracolumbar fusion. There is also associated fusion of bilateral sacroiliac joints. There is bilateral chronic hip dysplasia.       Postsurgical change of Nissen fundoplication. There is new paraesophageal hiatal hernia which is incompletely imaged superiorly. PEG tube is noted with balloon satisfactorily inflated in the stomach. No evidence of extravasation contrast to suggest acute active gastrointestinal bleed.   Large amount of stool throughout the colon; please correlate for constipation.   Urinary bladder wall thickening; please correlate with urinalysis for cystitis.   MACRO: None   Signed by: Mahendra Sethi 2/18/2024 9:00 PM Dictation workstation:   [id number]    XR chest 1 view    Result Date: 2/18/2024  Interpreted By:  Carlos Rodgers, STUDY: XR CHEST 1 VIEW;  2/18/2024 7:25 pm   INDICATION: Signs/Symptoms:facility concern for aspiration.   COMPARISON: 09/19/2016   ACCESSION NUMBER(S): [id number]   ORDERING CLINICIAN: YANNI BOYER   FINDINGS: Patient rotation limits evaluation. There is no evidence for focal consolidation. The cardiomediastinal silhouette is prominent and stable.   Redemonstration of long segment thoracic spinal fusion rods with multilevel cerclage wire reinforcement. There is discontinuity of the rods in the lower thoracic/lumbosacral region, 1 of which was present prior study in the other appearing new from prior study.       No evidence  of aspiration. New hardware fracture involving the right sided spinal fusion chad and unchanged fracture involving the left spinal fusion chad at the similar level.   MACRO: None.   Signed by: Carlos Rodgers 2/18/2024 7:40 PM Dictation workstation:   [id number]           This patient currently has cardiac telemetry ordered; if you would like to modify or discontinue the telemetry order, click here to go to the orders activity to modify/discontinue the order.                 Assessment/Plan   Principal Problem:    Gastrointestinal hemorrhage, unspecified gastrointestinal hemorrhage type  Active Problems:    Asthma    Cerebral palsy (CMS/HCC)    Dysphagia    Epilepsy (CMS/HCC)    Flexion contractures    GERD (gastroesophageal reflux disease)    Profound intellectual disability    Scoliosis    Spastic quadriparesis secondary to cerebral palsy (CMS/HCC)    Recurrent UTI    Neurogenic bladder    Acute blood loss anemia    Other constipation      ABLA 2/2 UGIB   -Evidence of bleeding in her G tube from facility  -No active extravasation on CTA; however, elevated BUN (31) compared to baseline with Hgb 15 (about a month ago) --> 10.3 -> 7.3  - s/p 2u pRBC, stable postop CBC. Will monitor CBC and transfuse for goal Hb >8.0  -Lactate OK at 1.7  -Continue to trend H&H    Gastric ulcer  -s/p emergency EGD, as per note large amount of old blood present with large circular ulcer crater with no active bleed.   -s/p repeat EGD 2/22 showed clean based ulcer, biopsies collected  -resume tube feeds  -Continue pantoprazole BID via PEG  -as per GI, repeat EGD in 3 months. GI spoke to sister prior to EGD for consent     Constipation / large stool burden, abdominal distention   -s/p 1x enema  -Continued on bowel regimen   -Follow abdominal exam   -GI consult appreciated     Leukocytosis   -Presenting with elevated WBCs at 17.2. Lactate 1.7   -Negative CXR for acute process. No obvious infectious etiology on abdominal imaging.   -History  of recurrent UTIs with bladder wall thickening on CT --> ordered UA   -Ordered blood cultures x2 out of abundance of caution   -Continue to trend fever curve, WBCs     New fracture of right-sided thoracic spinal hardware; known L sided thoracic spinal hardware fracture   -Surgery was completed in 1988 per records   -Suspect this can likely be followed up in the outpatient setting      Cerebral palsy with spastic quadriplegia  -Confirm and resume home medications  -Resident of [facility]      Seizure disorder   -Patient was given home dose of phenobarb in the ED x1  -Working on confirming the remainder of home meds / updating home med list  -as per patient's outpatient notes it appears she will typically get generalized tonic-clonic seizures 4-6x a year      Profound intellectual disability, baseline nonverbal status  -Resident of [facility] since age 13     Dysphagia s/p G-tube  -NPO, tube feeds as per nutrition. Previously on 900mL nutren w fiber + 2 scoops beneprotein + 2tbsp nutrisource fiber +1/4tsp salt +H2O = 1750mL divided into 5 feeds of 350mL each via g tube, 30mL water flush after each feed (12am, 6am, 12pm, 4:30pm, 8pm)  -Nutrition consulted     Neurogenic bladder (requires CIC at her facility) with recurrent UTIs  -UA shows 4+ bacteria, likely asymptomatic  -Can continue with straight cath / bladder scans  -continue ceftriaxone     Asthma without acute exacerbation   -Confirm and resume home therapies                 I spent 20 minutes in the professional and overall care of this patient.      Shay Radford MD

## 2024-02-23 NOTE — DISCHARGE SUMMARY
Discharge Diagnosis  Gastrointestinal hemorrhage, unspecified gastrointestinal hemorrhage type    Issues Requiring Follow-Up  Patient will need Repeat EGD with Dr. Mccallum in 3 months to check ulcer healing, follow up in GI clinic with Dr. Mccallum or GI APPs to arrange. Call [phone] to schedule.     Discharge Meds     Your medication list        ASK your doctor about these medications        Instructions Last Dose Given Next Dose Due   acetaminophen 160 mg/5 mL elixir  Commonly known as: Tylenol           Ventolin HFA 90 mcg/actuation inhaler  Generic drug: albuterol  Ask about: Which instructions should I use?           Ventolin HFA 90 mcg/actuation inhaler  Generic drug: albuterol  Ask about: Which instructions should I use?           ascorbic acid 250 mg tablet  Commonly known as: Vitamin C           baclofen 10 mg tablet  Commonly known as: Lioresal           bisacodyl 10 mg suppository  Commonly known as: Dulcolax           calcium carbonate-vitamin D3 500 mg-5 mcg (200 unit) tablet  Ask about: Which instructions should I use?           Children's Ibuprofen 100 mg/5 mL suspension  Generic drug: ibuprofen           diazePAM 5-7.5-10 mg rectal kit  Commonly known as: Diastat Acudial           Flovent  mcg/actuation inhaler  Generic drug: fluticasone           methenamine hippurate 1 gram tablet  Commonly known as: Hiprex      Take 1 tablet (1 g) by mouth 2 times a day.       multivitamin tablet           PHENobarbitaL 64.8 mg tablet           PHENobarbitaL 64.8 mg tablet           polyethylene glycol 17 gram packet  Commonly known as: Glycolax, Miralax           Vitamin D3 25 MCG (1000 UT) tablet  Generic drug: cholecalciferol                    Test Results Pending At Discharge  Pending Labs       Order Current Status    Surgical Pathology Exam In process            Hospital Course   Ruma Manzanares is a 43 y.o. female with PMHx s/f cerebral palsy with spastic quadriplegia, seizure disorder, profound  intellectual disability, baseline nonverbal status, dysphagia s/p G-tube, neurogenic bladder (requires CIC at her facility) with recurrent UTIs, asthma (no baseline O2), GERD, scoliosis, history of posterior thoracic vertebral fusion (1988), who presents from her care facility for evaluation of blood in G-tube, abdominal distention, and increased WOB. Patient is nonverbal and unable to provide history, so collateral is obtained from chart review, discussion with ED provider (who was able to speak with the patient's primary NP from the facility for additional information). In short, the pt came in after having a fairly active day. When caregivers were assisting the patient back into bed this evening, they noticed blood coming out of her G-tube (no prior noted history of GI bleeding). They also noted her abdomen looked distended, and that she seemed increasingly SOB compared to her baseline. She was sent into the ED for further evaluation.      ED Course (Summary):   Vitals on presentation: T97.0, /76, , RR 23, SpO2 94% RA  CBC: WBC 17.2 (neutrophil predominance on differential), Hgb 10.3 (from 15.0 on 01/12/24), platelets 331  CMP: Glucose 149, sodium 137, potassium 3.8, BUN 31, serum creatinine 0.33  Lactate 1.7  Imaging: CXR without acute cardiopulmonary process; does note new fracture of hardware of the spinal fusion on the R and prior known fracture of the chad on the L. CTA Abd/Pelvis with no acute GI bleed, large amount of stool, bladder wall thickening c/f cystitis, and a new incompletely imaged paraesophageal hernia compared to prior imaging   Interventions: 1L NS      ABLA 2/2 UGIB   -Evidence of bleeding in her G tube from facility  -No active extravasation on CTA; however, elevated BUN (31) compared to baseline with Hgb 15 (about a month ago) --> 10.3 -> 7.3  - s/p 2u pRBC, stable postop CBC. Will monitor CBC and transfuse for goal Hb >8.0  -Lactate OK at 1.7  -stable H/H     Gastric ulcer  -s/p  emergency EGD, as per note large amount of old blood present with large circular ulcer crater with no active bleed.   -s/p repeat EGD 2/22 showed clean based ulcer, biopsies collected  -as per GI, repeat EGD in 3 months. GI spoke to sister prior to EGD for consent  -resumed tube feeds  -Continue pantoprazole BID via PEG, to followup with outpatient GI     Constipation / large stool burden, abdominal distention   -s/p 1x enema  -Continued on bowel regimen   -Follow abdominal exam   -appears resolved     Leukocytosis (resolved)  -Presented with elevated WBCs at 17.2. Lactate 1.7   -Negative CXR for acute process. No obvious infectious etiology on abdominal imaging.   -History of recurrent UTIs with bladder wall thickening on CT --> UA negative nitrites, small LE   -blood cultures x2 negative     New fracture of right-sided thoracic spinal hardware; known L sided thoracic spinal hardware fracture   -Surgery was completed in 1988 per records   -Suspect this can likely be followed up in the outpatient setting      Cerebral palsy with spastic quadriplegia  -Confirm and resume home medications  -Resident of [facility]      Seizure disorder   -Patient was given home dose of phenobarb in the ED x1  -Working on confirming the remainder of home meds / updating home med list  -as per patient's outpatient notes it appears she will typically get generalized tonic-clonic seizures 4-6x a year      Profound intellectual disability, baseline nonverbal status  -Resident of [facility] since age 13     Dysphagia s/p G-tube  -NPO, tube feeds as per nutrition. Previously on 900mL nutren w fiber + 2 scoops beneprotein + 2tbsp nutrisource fiber +1/4tsp salt +H2O = 1750mL divided into 5 feeds of 350mL each via g tube, 30mL water flush after each feed (12am, 6am, 12pm, 4:30pm, 8pm)  -Nutrition consulted     Neurogenic bladder (requires CIC at her facility) with recurrent UTIs  -UA shows 4+ bacteria, likely asymptomatic  -Can continue with  straight cath / bladder scans  -s/p ceftriaxone x5d     Asthma without acute exacerbation   -Confirm and resume home therapies          Pertinent Physical Exam At Time of Discharge  Gen: Adult female, no acute distress  HEENT: Normocephalic, atraumatic, good dentition, no oral lesions appreciated  Neck: Supple. No cervical lymphadenopathy appreciated, no thyroid enlargement appreciated  CV: Regular rate and rhythm, S1 and S2 present, no murmurs rubs or gallops appreciated  Resp: Lungs clear to auscultation bilaterally, no ronchi, rales or wheezes appreciated  Abdomen: soft, nontender, nondistended, no guarding, no masses appreciated. G-tube present in abdomen, no bleeding appreciated  MSK: No joint swelling appreciated, full ROM  Psych: appropriate mood and affect  Neuro: awake, not moving, no response to voice    Outpatient Follow-Up  No future appointments.      Shay Radford MD

## 2024-02-23 NOTE — PROGRESS NOTES
Notified Bedside RN that patient is able to discharge back to [facility], patient just needs to be in the building by 4PM, the floor is to set up transport. Report number is [phone].

## 2024-02-23 NOTE — PROGRESS NOTES
Ruma Manzanares is a 43 y.o. female on day 5 of admission presenting with Gastrointestinal hemorrhage, unspecified gastrointestinal hemorrhage type.    Subjective   Patient seen and examined. Discussed with RN. 1 black BM this morning, 2 overnight. However, hemoglobin is normal and improved, BUN normal. Tolerating tube feeds. No bloody residual from PEG tube. Patient resting comfortably in bed.     10 point ROS unable to be obtained secondary to mental status.       Objective     Physical Exam  Vitals reviewed.   Constitutional:       General: She is not in acute distress.     Appearance: Normal appearance.   Cardiovascular:      Rate and Rhythm: Normal rate and regular rhythm.   Pulmonary:      Effort: Pulmonary effort is normal.      Breath sounds: Normal breath sounds.   Abdominal:      General: Bowel sounds are normal. There is no distension.      Palpations: Abdomen is soft.      Tenderness: There is no abdominal tenderness. There is no guarding or rebound.      Comments: PEG tube in place, dressing dry and intact   Musculoskeletal:      Comments: Contractures present in extremities   Neurological:      Mental Status: She is alert.      Comments: Asleep, unable to assess   Psychiatric:      Comments: Unable to assess     Last Recorded Vitals  Blood pressure 114/70, pulse 65, temperature 36.4 °C (97.6 °F), temperature source Temporal, resp. rate 16, height 1.219 m (4'), weight 49.3 kg (108 lb 11 oz), SpO2 92 %.  Intake/Output last 3 Shifts:  I/O last 3 completed shifts:  In: 250 (5.1 mL/kg) [I.V.:250 (5.1 mL/kg)]  Out: 1400 (28.4 mL/kg) [Urine:1400 (0.8 mL/kg/hr)]  Weight: 49.3 kg     Relevant Results      Scheduled medications  baclofen, 15 mg, g-tube, TID  cefTRIAXone, 1 g, intravenous, q24h  melatonin, 3 mg, oral, Daily  mometasone, 1 puff, inhalation, BID  pantoprazole, 40 mg, intravenous, BID  PHENobarbitaL, 64.8 mg, oral, q AM  PHENobarbitaL, 97.2 mg, oral, Nightly  polyethylene glycol, 17 g, oral,  Daily  potassium chloride, 40 mEq, oral, TID      Continuous medications  lactated Ringer's, 100 mL/hr, Last Rate: 100 mL/hr (02/23/24 0748)      PRN medications  PRN medications: bisacodyl, bisacodyl, guaiFENesin, ondansetron **OR** ondansetron    Results for orders placed or performed during the hospital encounter of 02/18/24 (from the past 24 hour(s))   Magnesium   Result Value Ref Range    Magnesium 2.05 1.60 - 2.40 mg/dL   Renal Function Panel   Result Value Ref Range    Glucose 119 (H) 74 - 99 mg/dL    Sodium 137 136 - 145 mmol/L    Potassium 3.1 (L) 3.5 - 5.3 mmol/L    Chloride 105 98 - 107 mmol/L    Bicarbonate 25 21 - 32 mmol/L    Anion Gap 10 10 - 20 mmol/L    Urea Nitrogen 7 6 - 23 mg/dL    Creatinine 0.31 (L) 0.50 - 1.05 mg/dL    eGFR >90 >60 mL/min/1.73m*2    Calcium 8.1 (L) 8.6 - 10.3 mg/dL    Phosphorus 3.3 2.5 - 4.9 mg/dL    Albumin 3.1 (L) 3.4 - 5.0 g/dL   CBC   Result Value Ref Range    WBC 7.6 4.4 - 11.3 x10*3/uL    nRBC 0.0 0.0 - 0.0 /100 WBCs    RBC 3.78 (L) 4.00 - 5.20 x10*6/uL    Hemoglobin 12.2 12.0 - 16.0 g/dL    Hematocrit 36.3 36.0 - 46.0 %    MCV 96 80 - 100 fL    MCH 32.3 26.0 - 34.0 pg    MCHC 33.6 32.0 - 36.0 g/dL    RDW 14.8 (H) 11.5 - 14.5 %    Platelets 285 150 - 450 x10*3/uL       * Cannot find OR log *  Last relevant procedure:                          Assessment/Plan   Principal Problem:    Gastrointestinal hemorrhage, unspecified gastrointestinal hemorrhage type  Active Problems:    Asthma    Cerebral palsy (CMS/HCC)    Dysphagia    Epilepsy (CMS/HCC)    Flexion contractures    GERD (gastroesophageal reflux disease)    Profound intellectual disability    Scoliosis    Spastic quadriparesis secondary to cerebral palsy (CMS/HCC)    Recurrent UTI    Neurogenic bladder    Acute blood loss anemia    Other constipation    uRma Manzanares is a 43 year old female with PMH of cerebral palsy with spastic quadriplegia, seizure disorder, profound intellectual disability with nonverbal baseline  status, dysphagia s/p G tube, neurogenic bladder with recurrent UTIs, asthma, and scoliosis presents from her care facility for blood in G tube.        Gastric ulcer  She has a gastrostomy tube in situ for long-term nutritional management.  On admission, she was found to have PEG draining norm blood per the gastrostomy tube with a drop of hemoglobin down to 7.3 g/dL. EGD on 2/19/2024 showed a large cratered ulcer in the gastric fundus. Ulcer was clean based (Sam class III). No evidence of recurrent bleeding. Repeat EGD done on 2/22/24 showed no evidence of GI bleeding. Patient's hemoglobin has improved, BUN normal. Did have some black stools. Due to no bloody aspirate from PEG, normal hemoglobin, and normal BUN, likely old blood in stool.   - Continue BID PPI  - NPO and stop tube feeds after midnight.  - Will arrange outpatient office appointment. Repeat EGD with Dr. Mccallum in 3 months to check ulcer healing  - monitor H&H and transfuse as needed       Case reviewed with Dr. Mccallum.     There is no inpatient GI coverage this weekend. Dr. Bradshaw is available via telephone only, but if there is a need for further GI evaluation or procedure then patient should be transferred. Dr. Lewis will provide coverage on 2/26/24.        Bev Todd PA-C

## 2024-02-23 NOTE — DOCUMENTATION CLARIFICATION NOTE
"    PATIENT:               ELBERT DOMINGUEZ  ACCT #:                  [id number]  MRN:                       [id number]  :                       1980  ADMIT DATE:       2024 6:02 PM  DISCH DATE:  RESPONDING PROVIDER #:        [id number]          PROVIDER RESPONSE TEXT:    UTI related to straight catheterizations, POA    CDI QUERY TEXT:    UH_UTI due to Urinary Device POA    Instruction:    Based on your assessment of the patient and the clinical information, please provide the requested documentation by clicking on the appropriate radio button and enter any additional information if prompted.    Question: Please further clarify the relationship between the UTI and the urinary device    When answering this query, please exercise your independent professional judgment. The fact that a question is being asked, does not imply that any particular answer is desired or expected.    The patient's clinical indicators include:  Clinical Information: Patient with cerebral palsy with noted straight caths with noted recurrent UTIs    Clinical Indicators: Per H and P, \"Recurrent UTI...Neurogenic bladder (requires CIC at her facility) with recurrent UTIs-UA shows 4+ bacteria, likely asymptomatic-Can continue with straight cath / bladder scans-continue ceftriaxone.\"     UA: large (3+) blood, small (1+) leuk est, hazy in appearance, 4+ bacteria, >20 RBC, 21-50 WBC    : WBC 17.2, abs neutrophils 13.89, T 97, P , R 23-28, P 123/76    Treatment: 500cc bolus x2, IV rocephin, UA    Risk Factors: 43yof with cerebral palsy with noted straight caths with noted recurrent UTIs  Options provided:  -- UTI related to straight catheterizations, POA  -- UTI unrelated to straight catheterizations  -- UTI ruled out  -- Other - I will add my own diagnosis  -- Refer to Clinical Documentation Reviewer    Query created by: Jamilah Ramos on 2024 11:26 AM      Electronically signed by:  SAKSHI TREVINO MD 2024 8:07 " AM

## 2024-03-01 LAB
LAB AP ASR DISCLAIMER: NORMAL
LABORATORY COMMENT REPORT: NORMAL
PATH REPORT.FINAL DX SPEC: NORMAL
PATH REPORT.GROSS SPEC: NORMAL
PATH REPORT.RELEVANT HX SPEC: NORMAL
PATH REPORT.TOTAL CANCER: NORMAL

## 2024-04-04 ENCOUNTER — TELEMEDICINE (OUTPATIENT)
Dept: POST ACUTE CARE | Facility: EXTERNAL LOCATION | Age: 44
End: 2024-04-04
Payer: MEDICARE

## 2024-04-04 DIAGNOSIS — M41.45 NEUROMUSCULAR SCOLIOSIS OF THORACOLUMBAR REGION: Chronic | ICD-10-CM

## 2024-04-04 DIAGNOSIS — G80.0 SPASTIC QUADRIPARESIS SECONDARY TO CEREBRAL PALSY (MULTI): Primary | Chronic | ICD-10-CM

## 2024-04-04 PROCEDURE — 99304 1ST NF CARE SF/LOW MDM 25: CPT | Performed by: ORTHOPAEDIC SURGERY

## 2024-04-04 NOTE — PROGRESS NOTES
Ruma Manzanares is a 43 y.o. female who presents today for evaluation of broken rods found incidentally on Xray.  She previously had surgery many years ago for scoliosis.   They deny back pain, neurologic symptoms, nocturia, or night pain.  She lives at [facility] and staff report no concerns about pain or deformity.  She does occasionally have other issues they want to make sure are not attributable to the chad fractures.    No past medical history on file.    Past Surgical History:   Procedure Laterality Date    OTHER SURGICAL HISTORY  07/12/2022    Posterior thoracic vertebral fusion    OTHER SURGICAL HISTORY  07/12/2022    Nissen fundoplication laparoscopic    OTHER SURGICAL HISTORY  07/12/2022    Hip surgery    OTHER SURGICAL HISTORY  07/12/2022    Gastrostomy tube insertion       Current Outpatient Medications on File Prior to Visit   Medication Sig Dispense Refill    acetaminophen (Tylenol) 160 mg/5 mL elixir by g-tube route every 4 hours if needed.      albuterol (Ventolin HFA) 90 mcg/actuation inhaler Inhale 2 puffs every 1 hour if needed for other (respiratory distress).      ascorbic acid (Vitamin C) 250 mg tablet 1 tablet (250 mg) by g-tube route 2 times a day.      baclofen (Lioresal) 10 mg tablet 1.5 tablets (15 mg) by g-tube route 3 times a day.      bisacodyl (Dulcolax) 10 mg suppository Insert 1 suppository (10 mg) into the rectum if needed.      calcium carbonate-vitamin D3 500 mg-5 mcg (200 unit) tablet 1 tablet by g-tube route once daily.      cholecalciferol (Vitamin D3) 25 MCG (1000 UT) tablet 1 tablet (1,000 Units) by g-tube route once daily.      diazePAM (Diastat Acudial) 5-7.5-10 mg rectal kit Insert 10 mg into the rectum every 6 hours if needed for seizures. Prior to administration, review instruction sheet supplied with dose unit. Verify the ordered dose is set for administration.      fluticasone (Flovent HFA) 110 mcg/actuation inhaler Inhale 2 puffs 2 times a day. Rinse mouth with  water after use to reduce aftertaste and incidence of candidiasis. Do not swallow.      ibuprofen (Children's Ibuprofen) 100 mg/5 mL suspension by g-tube route every 6 hours.      methenamine hippurate (Hiprex) 1 gram tablet Take 1 tablet (1 g) by mouth 2 times a day. (Patient taking differently: 1 tablet (1 g) by g-tube route 2 times a day.) 60 tablet 11    multivitamin tablet 1 tablet by g-tube route once daily.      pantoprazole (ProtoNix) 40 mg packet 1 packet (40 mg) by g-tube route 2 times a day before meals. 180 packet 0    PHENobarbitaL 64.8 mg tablet 1 tablet (64.8 mg) by g-tube route once daily in the morning.      PHENobarbitaL 64.8 mg tablet 1.5 tablets (97.2 mg) by g-tube route once daily in the evening. Take with meals.      polyethylene glycol (Glycolax, Miralax) 17 gram packet One packet (17g) via G tube, daily       No current facility-administered medications on file prior to visit.       No Known Allergies    No family history on file.    Social History     Socioeconomic History    Marital status: Single     Spouse name: Not on file    Number of children: Not on file    Years of education: Not on file    Highest education level: Not on file   Occupational History    Not on file   Tobacco Use    Smoking status: Never    Smokeless tobacco: Never   Substance and Sexual Activity    Alcohol use: Never    Drug use: Never    Sexual activity: Never   Other Topics Concern    Not on file   Social History Narrative    Not on file     Social Determinants of Health     Financial Resource Strain: Low Risk  (2/20/2024)    Overall Financial Resource Strain (CARDIA)     Difficulty of Paying Living Expenses: Not very hard   Food Insecurity: Patient Unable To Answer (2/20/2024)    Hunger Vital Sign     Worried About Running Out of Food in the Last Year: Patient unable to answer     Ran Out of Food in the Last Year: Patient unable to answer   Transportation Needs: Patient Unable To Answer (2/20/2024)    PRAPARE -  Transportation     Lack of Transportation (Medical): Patient unable to answer     Lack of Transportation (Non-Medical): Patient unable to answer   Physical Activity: Inactive (2/20/2024)    Exercise Vital Sign     Days of Exercise per Week: 0 days     Minutes of Exercise per Session: 0 min   Stress: No Stress Concern Present (2/20/2024)    [employer] [place] [employer] - Occupational Stress Questionnaire     Feeling of Stress : Not at all   Social Connections: Patient Unable To Answer (2/20/2024)    Social Connection and Isolation Panel [NHANES]     Frequency of Communication with Friends and Family: Patient unable to answer     Frequency of Social Gatherings with Friends and Family: Patient unable to answer     Attends Alevism Services: Patient unable to answer     Active Member of Clubs or Organizations: Patient unable to answer     Attends Club or Organization Meetings: Patient unable to answer     Marital Status: Patient unable to answer   Intimate Partner Violence: Patient Unable To Answer (2/20/2024)    Humiliation, Afraid, Rape, and Kick questionnaire     Fear of Current or Ex-Partner: Patient unable to answer     Emotionally Abused: Patient unable to answer     Physically Abused: Patient unable to answer     Sexually Abused: Patient unable to answer   Housing Stability: Patient Unable To Answer (2/20/2024)    Housing Stability Vital Sign     Unable to Pay for Housing in the Last Year: Patient unable to answer     Number of Places Lived in the Last Year: 1     Unstable Housing in the Last Year: Patient unable to answer       ROS:  A 16 system review is negative in all systems except those listed above in the history, as reviewed by me.        Physical Exam:    General :  Well developed, well nourished female in no acute distress.  Skin:  The skin is intact with no evidence of abrasions, bruises, or swelling.  She has no gross motion in her back, no palpable implants or defects, no pain with  flexion or extension of her back, and no real changes in her demeanor with movement.  Her incision is well healed with no signs of infection.    XR: I was able to review her films to see the implants with no fracture prior to 2010, a broken chad on the left after 2010, and a broken left chad by 2024.  Approximately 15 minutes was spent just reviewing and unarchiving her old records.    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  Her fusion was likely a long time ago.  She had a L chad fracture in 2010 and the right has broken between 2016 and 2024, although I don't know when.  She has no movement in her back and no complaints of pain have been noted.  We will observe for now and she can be seen PRN.

## 2024-04-23 ENCOUNTER — TELEPHONE (OUTPATIENT)
Dept: GASTROENTEROLOGY | Facility: CLINIC | Age: 44
End: 2024-04-23

## 2024-04-23 ENCOUNTER — APPOINTMENT (OUTPATIENT)
Dept: GASTROENTEROLOGY | Facility: CLINIC | Age: 44
End: 2024-04-23
Payer: MEDICARE

## 2024-04-23 NOTE — TELEPHONE ENCOUNTER
Patient's living facility notified that todays apt with GI will need rescheduled due to provider out of office.     Office to call back when providers schedule is figured out.

## 2024-05-10 ENCOUNTER — TELEPHONE (OUTPATIENT)
Dept: GASTROENTEROLOGY | Facility: CLINIC | Age: 44
End: 2024-05-10

## 2024-05-10 ENCOUNTER — OFFICE VISIT (OUTPATIENT)
Dept: GASTROENTEROLOGY | Facility: CLINIC | Age: 44
End: 2024-05-10
Payer: MEDICARE

## 2024-05-10 VITALS
SYSTOLIC BLOOD PRESSURE: 121 MMHG | HEIGHT: 55 IN | HEART RATE: 70 BPM | WEIGHT: 100.73 LBS | DIASTOLIC BLOOD PRESSURE: 68 MMHG | OXYGEN SATURATION: 94 % | BODY MASS INDEX: 23.31 KG/M2

## 2024-05-10 DIAGNOSIS — K25.9 GASTRIC ULCER WITHOUT HEMORRHAGE OR PERFORATION, UNSPECIFIED CHRONICITY: Primary | ICD-10-CM

## 2024-05-10 PROBLEM — K92.2 GASTROINTESTINAL HEMORRHAGE, UNSPECIFIED GASTROINTESTINAL HEMORRHAGE TYPE: Status: RESOLVED | Noted: 2024-02-18 | Resolved: 2024-05-10

## 2024-05-10 PROCEDURE — 99214 OFFICE O/P EST MOD 30 MIN: CPT | Performed by: PHYSICIAN ASSISTANT

## 2024-05-10 PROCEDURE — 1036F TOBACCO NON-USER: CPT | Performed by: PHYSICIAN ASSISTANT

## 2024-05-10 RX ORDER — ESOMEPRAZOLE MAGNESIUM 40 MG/1
GRANULE, DELAYED RELEASE ORAL
COMMUNITY
Start: 2024-04-20

## 2024-05-10 NOTE — PROGRESS NOTES
Subjective   Patient ID: Ruma Manzanares is a 43 y.o. female who was presents for Follow-up (Hospital stay 2/18 - 2/23 - egds done 2/19 and 2/22).    PMH: cerebral palsy with spastic quadriplegia, seizure disorder, profound intellectual disability with nonverbal baseline status, dysphagia s/p G tube, neurogenic bladder with recurrent UTIs, asthma, and scoliosis     HPI  Patient was admitted to [facility] from 2/19-2/23 for bleeding from G tube. She underwent EGD on 2/19 that showed a 40mm large, deep, round gastric ulcer with large amount of blood in the stomach. Subsequent EGD on 2/22 that showed a gastric ulcer, PEG tube in the stomach, and gastritis. Biopsies were obtained that showed gastric mucosa with marked chronic gastirits with intestinal metaplasia. No evidence of H. Pylori infection.    Patient is accompanied by care givers. She is nonverbal so all the information is obtained from her care giver. There has been no report of blood G tube output, N/V, melena, hematochezia. She has been tolerating her tube feeds. She is on BID Nexium.    Prior GI evaluation:  EGD 2/19/2024: Gastric ulcer with retained blood in stomach  EGD 2/22/2024: gastritic ulcer with no bleeding seen. Biopsies showed chronic gastritis with intestinal metaplasia. No evidence of H. pylori    Review of Systems:  Unable to be determined due to mental status.    Medications:  Prior to Admission medications    Medication Sig Start Date End Date Taking? Authorizing Provider   esomeprazole (NexIUM) 40 mg packet  4/20/24  Yes Historical Provider, MD   acetaminophen (Tylenol) 160 mg/5 mL elixir by g-tube route every 4 hours if needed.    Historical Provider, MD   albuterol (Ventolin HFA) 90 mcg/actuation inhaler Inhale 2 puffs every 1 hour if needed for other (respiratory distress).    Historical Provider, MD   ascorbic acid (Vitamin C) 250 mg tablet 1 tablet (250 mg) by g-tube route 2 times a day.    Historical Provider, MD   baclofen (Lioresal) 10 mg  tablet 1.5 tablets (15 mg) by g-tube route 3 times a day.    Historical Provider, MD   bisacodyl (Dulcolax) 10 mg suppository Insert 1 suppository (10 mg) into the rectum if needed.    Historical Provider, MD   calcium carbonate-vitamin D3 500 mg-5 mcg (200 unit) tablet 1 tablet by g-tube route once daily.    Historical Provider, MD   cholecalciferol (Vitamin D3) 25 MCG (1000 UT) tablet 1 tablet (1,000 Units) by g-tube route once daily.    Historical Provider, MD   diazePAM (Diastat Acudial) 5-7.5-10 mg rectal kit Insert 10 mg into the rectum every 6 hours if needed for seizures. Prior to administration, review instruction sheet supplied with dose unit. Verify the ordered dose is set for administration.    Historical Provider, MD   fluticasone (Flovent HFA) 110 mcg/actuation inhaler Inhale 2 puffs 2 times a day. Rinse mouth with water after use to reduce aftertaste and incidence of candidiasis. Do not swallow.    Historical Provider, MD   ibuprofen (Children's Ibuprofen) 100 mg/5 mL suspension by g-tube route every 6 hours.    Historical Provider, MD   methenamine hippurate (Hiprex) 1 gram tablet Take 1 tablet (1 g) by mouth 2 times a day.  Patient taking differently: 1 tablet (1 g) by g-tube route 2 times a day. 12/12/23 12/11/24  Zachary Frederick MD   multivitamin tablet 1 tablet by g-tube route once daily.    Historical Provider, MD   pantoprazole (ProtoNix) 40 mg packet 1 packet (40 mg) by g-tube route 2 times a day before meals. 2/23/24 5/23/24  Shay Radford MD   PHENobarbitaL 64.8 mg tablet 1 tablet (64.8 mg) by g-tube route once daily in the morning.    Historical Provider, MD   PHENobarbitaL 64.8 mg tablet 1.5 tablets (97.2 mg) by g-tube route once daily in the evening. Take with meals.    Historical Provider, MD   polyethylene glycol (Glycolax, Miralax) 17 gram packet One packet (17g) via G tube, daily 2/23/24   Shya Radford MD       Allergies:  Patient has no known allergies.    Past Medical  History:  She has no past medical history on file.    Past Surgical History:  She has a past surgical history that includes Other surgical history (07/12/2022); Other surgical history (07/12/2022); Other surgical history (07/12/2022); and Other surgical history (07/12/2022).    Social History:  She reports that she has never smoked. She has never used smokeless tobacco. She reports that she does not drink alcohol and does not use drugs.    Objective   Physical exam:  Constitutional: Well developed, well nourished 43 y.o. year old in no acute distress. In wheelchair, nonverbal.  Skin: Warm and dry. Normal turgor. No rash, ulcer, trauma, jaundice.   Eyes: Pupils symmetric and reactive to light.  Respiratory: Clear to auscultation bilaterally. No wheezes, rhonchi, or rales heard.  Cardiovascular: Regular rate and rhythm. S1 and S2 appreciated and normal. No murmur, rub, or gallop heard.   Edema: No edema noted.  GI: Normal bowel sounds. Soft, non-distended, appears non-tender. No rebound or guarding present. No hepatomegaly or splenomegaly appreciated. Abdominal aorta not palpably abnormal. G tube in place.   Psych: Unable to assess        Relevant Results Recent labs reviewed in the EMR.  Lab Results   Component Value Date    HGB 12.2 02/23/2024    HGB 10.8 (L) 02/21/2024    HGB 11.4 (L) 02/19/2024    MCV 96 02/23/2024    MCV 97 02/21/2024     02/18/2024     02/23/2024     02/21/2024     02/18/2024       Lab Results   Component Value Date    FERRITIN 29 01/12/2024    IRON 45 02/19/2024       Lab Results   Component Value Date     02/23/2024    K 3.1 (L) 02/23/2024     02/23/2024    BUN 7 02/23/2024    CREATININE 0.31 (L) 02/23/2024       Lab Results   Component Value Date    BILITOT 0.2 02/18/2024     Lab Results   Component Value Date    ALT 80 (H) 02/18/2024    ALT 36 01/12/2024    ALT 36 01/12/2024    AST 61 (H) 02/18/2024    AST 30 01/12/2024    AST 30 01/12/2024    VITAPHOS  "161 (H) 02/18/2024    ALKPHOS 169 (H) 01/12/2024    ALKPHOS 169 (H) 01/12/2024       Lab Results   Component Value Date    CRP 0.41 01/12/2024    CRP 0.57 01/13/2023    CRP 1.17 (A) 01/19/2022    CRP 0.74 01/22/2021       No results found for: \"CALPS\"    Radiology: Reviewed imaging reviewed in the EMR.  No results found.    Assessment/Plan   Problem List Items Addressed This Visit             ICD-10-CM    Gastric ulcer - Primary K25.9     Patient with gastric ulcer on EGD in February 2019. She is on BID PPI. I recommended she continue this. Repeat EGD was recommended in 3 months. Order placed. I called patient's mother, Vianca Manzanares, who gave approval for EGD. No signs of recurrent GI bleed.          Relevant Orders    Esophagogastroduodenoscopy (EGD)         Bev Todd PA-C    "

## 2024-05-10 NOTE — TELEPHONE ENCOUNTER
----- Message from Karolyn Espinosa MA sent at 5/10/2024  9:36 AM EDT -----  Regarding: EGD  Patient caregiver to call back    OR   Dr. Lewis or Dr. Mccallum

## 2024-05-10 NOTE — ASSESSMENT & PLAN NOTE
Patient with gastric ulcer on EGD in February 2019. She is on BID PPI. I recommended she continue this. Repeat EGD was recommended in 3 months. Order placed. I called patient's mother, Vianca Manzanares, who gave approval for EGD. No signs of recurrent GI bleed.

## 2024-05-10 NOTE — PATIENT INSTRUCTIONS
Thank you for coming in for your appointment.    -get EGD done to make sure ulcer is healed.  -Continue on Nexium twice daily for now

## 2024-06-04 ENCOUNTER — TELEMEDICINE (OUTPATIENT)
Dept: UROLOGY | Facility: CLINIC | Age: 44
End: 2024-06-04
Payer: MEDICARE

## 2024-06-04 DIAGNOSIS — N39.0 RECURRENT UTI: Primary | ICD-10-CM

## 2024-06-04 PROCEDURE — 99442 PR PHYS/QHP TELEPHONE EVALUATION 11-20 MIN: CPT | Performed by: STUDENT IN AN ORGANIZED HEALTH CARE EDUCATION/TRAINING PROGRAM

## 2024-06-04 RX ORDER — METHENAMINE HIPPURATE 1000 MG/1
1 TABLET ORAL 2 TIMES DAILY
Qty: 180 TABLET | Refills: 3 | Status: SHIPPED | OUTPATIENT
Start: 2024-06-04 | End: 2025-06-04

## 2024-06-04 NOTE — PROGRESS NOTES
Virtual or Telephone Consent    A telephone visit (audio only) between the patient (at the originating site) and the provider (at the distant site) was utilized to provide this telehealth service.   Verbal consent was requested and obtained for minor from  on this date, 06/04/24, for a telehealth visit.            HISTORY OF PRESENT ILLNESS:  Doing well, cic 3x/day no further infections, doing well          Past Medical History  She has no past medical history on file.    Surgical History  She has a past surgical history that includes Other surgical history (07/12/2022); Other surgical history (07/12/2022); Other surgical history (07/12/2022); and Other surgical history (07/12/2022).     Social History  She reports that she has never smoked. She has never used smokeless tobacco. She reports that she does not drink alcohol and does not use drugs.    Family History  No family history on file.     Allergies  Patient has no known allergies.      A comprehensive 10+ review of systems was negative except for: see hpi                     Assessment:  43-year-old with cerebral palsy and neurogenic bladder with history of recurrent UTI     Carrie:  no UTIs since 1/2022  continue methenamine bid 1 gm   Standing culture placed  CIC 3x/day      Follow-up in 6 months       Zachary Frederick MD

## 2024-06-06 ENCOUNTER — ANESTHESIA EVENT (OUTPATIENT)
Dept: OPERATING ROOM | Facility: HOSPITAL | Age: 44
End: 2024-06-06
Payer: MEDICARE

## 2024-06-06 ENCOUNTER — ANESTHESIA (OUTPATIENT)
Dept: OPERATING ROOM | Facility: HOSPITAL | Age: 44
End: 2024-06-06
Payer: MEDICARE

## 2024-06-06 ENCOUNTER — HOSPITAL ENCOUNTER (OUTPATIENT)
Dept: OPERATING ROOM | Facility: HOSPITAL | Age: 44
Discharge: HOME | End: 2024-06-06
Payer: MEDICARE

## 2024-06-06 VITALS
WEIGHT: 100 LBS | RESPIRATION RATE: 16 BRPM | TEMPERATURE: 97.2 F | HEART RATE: 50 BPM | OXYGEN SATURATION: 93 % | SYSTOLIC BLOOD PRESSURE: 106 MMHG | HEIGHT: 55 IN | DIASTOLIC BLOOD PRESSURE: 83 MMHG | BODY MASS INDEX: 23.14 KG/M2

## 2024-06-06 DIAGNOSIS — K25.9 GASTRIC ULCER WITHOUT HEMORRHAGE OR PERFORATION, UNSPECIFIED CHRONICITY: ICD-10-CM

## 2024-06-06 PROCEDURE — 3700000001 HC GENERAL ANESTHESIA TIME - INITIAL BASE CHARGE: Performed by: NURSE ANESTHETIST, CERTIFIED REGISTERED

## 2024-06-06 PROCEDURE — 3600000007 HC OR TIME - EACH INCREMENTAL 1 MINUTE - PROCEDURE LEVEL TWO: Performed by: NURSE ANESTHETIST, CERTIFIED REGISTERED

## 2024-06-06 PROCEDURE — 2500000005 HC RX 250 GENERAL PHARMACY W/O HCPCS: Performed by: NURSE ANESTHETIST, CERTIFIED REGISTERED

## 2024-06-06 PROCEDURE — 7100000009 HC PHASE TWO TIME - INITIAL BASE CHARGE: Performed by: NURSE ANESTHETIST, CERTIFIED REGISTERED

## 2024-06-06 PROCEDURE — 7100000010 HC PHASE TWO TIME - EACH INCREMENTAL 1 MINUTE: Performed by: NURSE ANESTHETIST, CERTIFIED REGISTERED

## 2024-06-06 PROCEDURE — 2500000004 HC RX 250 GENERAL PHARMACY W/ HCPCS (ALT 636 FOR OP/ED): Performed by: ANESTHESIOLOGY

## 2024-06-06 PROCEDURE — 3600000002 HC OR TIME - INITIAL BASE CHARGE - PROCEDURE LEVEL TWO: Performed by: NURSE ANESTHETIST, CERTIFIED REGISTERED

## 2024-06-06 PROCEDURE — 3700000002 HC GENERAL ANESTHESIA TIME - EACH INCREMENTAL 1 MINUTE: Performed by: NURSE ANESTHETIST, CERTIFIED REGISTERED

## 2024-06-06 PROCEDURE — 43235 EGD DIAGNOSTIC BRUSH WASH: CPT | Performed by: INTERNAL MEDICINE

## 2024-06-06 PROCEDURE — 2500000004 HC RX 250 GENERAL PHARMACY W/ HCPCS (ALT 636 FOR OP/ED): Performed by: NURSE ANESTHETIST, CERTIFIED REGISTERED

## 2024-06-06 RX ORDER — LIDOCAINE HYDROCHLORIDE 20 MG/ML
INJECTION, SOLUTION INFILTRATION; PERINEURAL AS NEEDED
Status: DISCONTINUED | OUTPATIENT
Start: 2024-06-06 | End: 2024-06-06

## 2024-06-06 RX ORDER — SODIUM CHLORIDE, SODIUM LACTATE, POTASSIUM CHLORIDE, CALCIUM CHLORIDE 600; 310; 30; 20 MG/100ML; MG/100ML; MG/100ML; MG/100ML
100 INJECTION, SOLUTION INTRAVENOUS CONTINUOUS
Status: DISCONTINUED | OUTPATIENT
Start: 2024-06-06 | End: 2024-06-07 | Stop reason: HOSPADM

## 2024-06-06 RX ORDER — FENTANYL CITRATE 50 UG/ML
INJECTION, SOLUTION INTRAMUSCULAR; INTRAVENOUS AS NEEDED
Status: DISCONTINUED | OUTPATIENT
Start: 2024-06-06 | End: 2024-06-06

## 2024-06-06 RX ORDER — ONDANSETRON HYDROCHLORIDE 2 MG/ML
4 INJECTION, SOLUTION INTRAVENOUS ONCE AS NEEDED
Status: CANCELLED | OUTPATIENT
Start: 2024-06-06

## 2024-06-06 RX ORDER — OXYCODONE HYDROCHLORIDE 5 MG/1
5 TABLET ORAL EVERY 4 HOURS PRN
Status: CANCELLED | OUTPATIENT
Start: 2024-06-06

## 2024-06-06 RX ORDER — SODIUM CHLORIDE, SODIUM LACTATE, POTASSIUM CHLORIDE, CALCIUM CHLORIDE 600; 310; 30; 20 MG/100ML; MG/100ML; MG/100ML; MG/100ML
100 INJECTION, SOLUTION INTRAVENOUS CONTINUOUS
Status: CANCELLED | OUTPATIENT
Start: 2024-06-06

## 2024-06-06 RX ORDER — FAMOTIDINE 10 MG/ML
20 INJECTION INTRAVENOUS ONCE
Status: COMPLETED | OUTPATIENT
Start: 2024-06-06 | End: 2024-06-06

## 2024-06-06 RX ORDER — MORPHINE SULFATE 2 MG/ML
1 INJECTION, SOLUTION INTRAMUSCULAR; INTRAVENOUS EVERY 5 MIN PRN
Status: CANCELLED | OUTPATIENT
Start: 2024-06-06

## 2024-06-06 RX ORDER — LIDOCAINE HYDROCHLORIDE 10 MG/ML
0.1 INJECTION, SOLUTION EPIDURAL; INFILTRATION; INTRACAUDAL; PERINEURAL ONCE
Status: CANCELLED | OUTPATIENT
Start: 2024-06-06 | End: 2024-06-06

## 2024-06-06 RX ADMIN — FENTANYL CITRATE 25 MCG: 50 INJECTION INTRAMUSCULAR; INTRAVENOUS at 09:24

## 2024-06-06 RX ADMIN — SODIUM CHLORIDE, POTASSIUM CHLORIDE, SODIUM LACTATE AND CALCIUM CHLORIDE 100 ML/HR: 600; 310; 30; 20 INJECTION, SOLUTION INTRAVENOUS at 08:50

## 2024-06-06 RX ADMIN — FAMOTIDINE 20 MG: 10 INJECTION, SOLUTION INTRAVENOUS at 08:50

## 2024-06-06 RX ADMIN — LIDOCAINE HYDROCHLORIDE 40 MG: 20 INJECTION, SOLUTION INFILTRATION; PERINEURAL at 09:24

## 2024-06-06 SDOH — HEALTH STABILITY: MENTAL HEALTH: CURRENT SMOKER: 0

## 2024-06-06 ASSESSMENT — COLUMBIA-SUICIDE SEVERITY RATING SCALE - C-SSRS
6. HAVE YOU EVER DONE ANYTHING, STARTED TO DO ANYTHING, OR PREPARED TO DO ANYTHING TO END YOUR LIFE?: NO
2. HAVE YOU ACTUALLY HAD ANY THOUGHTS OF KILLING YOURSELF?: NO
1. IN THE PAST MONTH, HAVE YOU WISHED YOU WERE DEAD OR WISHED YOU COULD GO TO SLEEP AND NOT WAKE UP?: NO

## 2024-06-06 ASSESSMENT — PAIN - FUNCTIONAL ASSESSMENT
PAIN_FUNCTIONAL_ASSESSMENT: 0-10
PAIN_FUNCTIONAL_ASSESSMENT: 0-10

## 2024-06-06 ASSESSMENT — PAIN SCALES - GENERAL
PAINLEVEL_OUTOF10: 0 - NO PAIN
PAINLEVEL_OUTOF10: 0 - NO PAIN
PAIN_LEVEL: 0

## 2024-06-06 NOTE — ANESTHESIA PREPROCEDURE EVALUATION
Patient: Ruma Manzanares    Procedure Information       Date/Time: 06/06/24 0900    Scheduled providers: Efren Mccallum MD    Procedure: EGD    Location: [facility] OR            Relevant Problems   Pulmonary   (+) Asthma (HHS-HCC)      Neuro   (+) Epilepsy (Multi)   (+) Spastic quadriparesis secondary to cerebral palsy (Multi)      GI   (+) Dysphagia   (+) GERD (gastroesophageal reflux disease)   (+) Gastric ulcer      /Renal   (+) Recurrent UTI      Hematology   (+) Acute blood loss anemia      Musculoskeletal   (+) Scoliosis      ID   (+) Recurrent UTI       Clinical information reviewed:   Tobacco  Allergies  Meds   Med Hx  Surg Hx   Fam Hx  Soc Hx        NPO Detail:  NPO/Void Status  Date of Last Liquid: 06/06/24  Time of Last Liquid: 0400  Date of Last Solid: 06/05/24  Time of Last Solid: 2355         Physical Exam    Airway  Mallampati: II  TM distance: >3 FB  Neck ROM: full     Cardiovascular - normal exam     Dental - normal exam     Pulmonary - normal exam     Abdominal - normal exam         Anesthesia Plan    History of general anesthesia?: yes  History of complications of general anesthesia?: no    ASA 3     MAC     The patient is not a current smoker.    intravenous induction   Anesthetic plan and risks discussed with patient.    Plan discussed with CRNA.

## 2024-06-06 NOTE — ANESTHESIA POSTPROCEDURE EVALUATION
Patient: Ruma Manzanares    Procedure Summary       Date: 06/06/24 Room / Location: [facility] OR    Anesthesia Start: 0918 Anesthesia Stop: 0936    Procedure: EGD Diagnosis: Gastric ulcer without hemorrhage or perforation, unspecified chronicity    Scheduled Providers: Efren Mccallum MD Responsible Provider: JAMES Simmons    Anesthesia Type: MAC ASA Status: 3            Anesthesia Type: MAC    Vitals Value Taken Time   /83 06/06/24 1000   Temp 36.2 °C (97.2 °F) 06/06/24 1000   Pulse 50 06/06/24 1000   Resp 16 06/06/24 1000   SpO2 93 % 06/06/24 1000       Anesthesia Post Evaluation    Patient location during evaluation: PACU  Patient participation: complete - patient participated  Level of consciousness: awake  Pain score: 0  Pain management: adequate  Airway patency: patent  Cardiovascular status: acceptable  Respiratory status: acceptable  Hydration status: acceptable  Postoperative Nausea and Vomiting: none    No notable events documented.

## 2024-06-06 NOTE — H&P
History Of Present Illness  Ruma Manzanares is a 43 y.o. female presenting for follow-up evaluation regarding a diagnosis of a deep gastric fundal ulcer and made on 2/19/2024.  Biopsies were negative for malignancy and H. pylori infection.  She has been treated with pantoprazole for the past 3 months and returns now for recheck examination..     Past Medical History  She has a past medical history of Arthritis, Asthma (HHS-HCC), Cerebral palsy (Multi), Cognitive disorder, GERD (gastroesophageal reflux disease), Scoliosis, Seizure disorder (Multi), and Spinal stenosis.    Surgical History  She has a past surgical history that includes Other surgical history (07/12/2022); Other surgical history (07/12/2022); Other surgical history (07/12/2022); and Other surgical history (07/12/2022).     Social History  She reports that she has never smoked. She has never used smokeless tobacco. She reports that she does not drink alcohol and does not use drugs.    Family History  No family history on file.     Allergies  Patient has no known allergies.    Review of Systems  Patient is unable to give additional history or respond to questions  Physical Exam  Head and neck examinations were  unremarkable. There was no temporal wasting. No jaundice noted. No thyromegaly.  No carotid bruits.  There is no peripheral lymphadenopathy.  Lungs were clear to auscultation. There when no rales, rhonchi or wheezes.  Cardiac examination revealed normal heart sounds. Rhythm was sinus . There were no murmurs.  Abdomen was full and soft. There was no organomegaly. Bowel sounds were normo- active. There was no ascites. The abdomen was nontender.  Rectal examination was not done.  Extremities: No edema or joint swelling.  Neurological examination: Patient was alert, oriented in person place, and time. There when no focal neurological signs. Cranial nerves were grossly intact. No evidence of encephalopathy. There was no asterixis. The  plantar response  was flexor.    Last Recorded Vitals  Blood pressure 108/73, pulse 51, temperature 36.2 °C (97.1 °F), temperature source Temporal, resp. rate 23, height 1.219 m (4'), weight 45.4 kg (100 lb), SpO2 94%.    Relevant Results             Assessment/Plan  43-year-old lady with history of a large gastric fundal ulcer diagnosed in February 2024.  She has been treated with pantoprazole daily since then.  She returns now for surveillance examination.  For upper GI endoscopy today.  Efren Mccallum MD

## 2024-07-18 ENCOUNTER — APPOINTMENT (OUTPATIENT)
Dept: GASTROENTEROLOGY | Facility: CLINIC | Age: 44
End: 2024-07-18
Payer: MEDICARE

## 2024-07-31 ENCOUNTER — LAB REQUISITION (OUTPATIENT)
Dept: LAB | Facility: HOSPITAL | Age: 44
End: 2024-07-31
Payer: MEDICARE

## 2024-07-31 DIAGNOSIS — Z51.81 ENCOUNTER FOR THERAPEUTIC DRUG LEVEL MONITORING: ICD-10-CM

## 2024-07-31 DIAGNOSIS — G80.0 SPASTIC QUADRIPLEGIC CEREBRAL PALSY (MULTI): ICD-10-CM

## 2024-07-31 DIAGNOSIS — Z79.899 OTHER LONG TERM (CURRENT) DRUG THERAPY: ICD-10-CM

## 2024-07-31 DIAGNOSIS — G40.911 EPILEPSY, UNSPECIFIED, INTRACTABLE, WITH STATUS EPILEPTICUS (MULTI): ICD-10-CM

## 2024-07-31 LAB
ALBUMIN SERPL BCP-MCNC: 3.5 G/DL (ref 3.4–5)
ALP SERPL-CCNC: 209 U/L (ref 33–110)
ALT SERPL W P-5'-P-CCNC: 58 U/L (ref 7–45)
AST SERPL W P-5'-P-CCNC: 37 U/L (ref 9–39)
BILIRUB DIRECT SERPL-MCNC: 0.1 MG/DL (ref 0–0.3)
BILIRUB SERPL-MCNC: 0.4 MG/DL (ref 0–1.2)
PHENOBARB SERPL-MCNC: 28.7 UG/ML (ref 10–40)
PHOSPHATE SERPL-MCNC: 3.3 MG/DL (ref 2.5–4.9)
PROT SERPL-MCNC: 6.6 G/DL (ref 6.4–8.2)

## 2024-07-31 PROCEDURE — 80184 ASSAY OF PHENOBARBITAL: CPT | Mod: OUT | Performed by: PEDIATRICS

## 2024-07-31 PROCEDURE — 84100 ASSAY OF PHOSPHORUS: CPT | Mod: OUT | Performed by: PEDIATRICS

## 2024-07-31 PROCEDURE — 80076 HEPATIC FUNCTION PANEL: CPT | Mod: OUT | Performed by: PEDIATRICS

## 2024-07-31 PROCEDURE — 36415 COLL VENOUS BLD VENIPUNCTURE: CPT | Performed by: PEDIATRICS

## 2024-09-09 DIAGNOSIS — N39.0 RECURRENT UTI: ICD-10-CM

## 2024-09-26 ENCOUNTER — LAB REQUISITION (OUTPATIENT)
Dept: LAB | Facility: HOSPITAL | Age: 44
End: 2024-09-26
Payer: MEDICARE

## 2024-09-26 DIAGNOSIS — R05.9 COUGH, UNSPECIFIED: ICD-10-CM

## 2024-09-26 PROCEDURE — 87798 DETECT AGENT NOS DNA AMP: CPT | Mod: OUT,PORLAB | Performed by: PEDIATRICS

## 2024-09-27 LAB — RHINOVIRUS RNA UPPER RESP QL NAA+PROBE: NOT DETECTED

## 2024-12-03 ENCOUNTER — APPOINTMENT (OUTPATIENT)
Dept: UROLOGY | Facility: CLINIC | Age: 44
End: 2024-12-03
Payer: MEDICARE

## 2024-12-03 DIAGNOSIS — N39.0 RECURRENT UTI: Primary | ICD-10-CM

## 2024-12-03 PROCEDURE — 99442 PR PHYS/QHP TELEPHONE EVALUATION 11-20 MIN: CPT | Performed by: STUDENT IN AN ORGANIZED HEALTH CARE EDUCATION/TRAINING PROGRAM

## 2024-12-03 NOTE — PROGRESS NOTES
Virtual or Telephone Consent    A telephone visit (audio only) between the patient (at the originating site) and the provider (at the distant site) was utilized to provide this telehealth service.   Verbal consent was requested and obtained from Ruma Manzanares on this date, 12/03/24 for a telehealth visit.     HISTORY OF PRESENT ILLNESS:  Ruma Manzanares is a 44 y.o. female who presents today for a virtual follow up visit. She is doing well. She has had no infections. She is still on methenamine.           Past Medical History  She has a past medical history of Arthritis, Asthma ([facility]), Cerebral palsy (Multi), Cognitive disorder, GERD (gastroesophageal reflux disease), Scoliosis, Seizure disorder (Multi), and Spinal stenosis.    Surgical History  She has a past surgical history that includes Other surgical history (07/12/2022); Other surgical history (07/12/2022); Other surgical history (07/12/2022); and Other surgical history (07/12/2022).     Social History  She reports that she has never smoked. She has never used smokeless tobacco. She reports that she does not drink alcohol and does not use drugs.    Family History  No family history on file.     Allergies  Patient has no known allergies.      A comprehensive 10+ review of systems was negative except for: see hpi                  Assessment:  44-year-old with cerebral palsy and neurogenic bladder with history of recurrent UTI     Carrie:  no UTIs since 1/2022  continue methenamine bid 1 gm   Standing culture placed  CIC [id number]      Follow up in 1 year       I spent a total of 11 minutes speaking with the patient on the telephone     All questions and concerns were answered and addressed.  The patient expressed understanding and agrees with the plan.     Zachary Frederick MD    Scribe Attestation  By signing my name below, INubia Scribe   attest that this documentation has been prepared under the direction and in the presence of Zachary Frederick MD.

## 2025-01-06 NOTE — PROGRESS NOTES
Subjective   Patient ID: Ruma Manzanares is a 44 y.o. female who was presents for Follow-up (Follow up/Last OV 5/10/24 - EGD done 6/6/24).    PMH: cerebral palsy with spastic quadriplegia, seizure disorder, profound intellectual disability with nonverbal baseline status, dysphagia s/p G tube, neurogenic bladder with recurrent UTIs, asthma, and scoliosis     HPI  Patient was admitted to [facility] from 2/19-2/23 for bleeding from G tube. She underwent EGD on 2/19 that showed a 40mm large, deep, round gastric ulcer with large amount of blood in the stomach. Subsequent EGD on 2/22 that showed a gastric ulcer, PEG tube in the stomach, and gastritis. Biopsies were obtained that showed gastric mucosa with marked chronic gastirits with intestinal metaplasia. No evidence of H. Pylori infection.    She was last seen in May 2024. I had recommended BID PPI and repeat EGD to ensure healing. Repeat EGD was done on 6/6/24 that showed mild gastritis but was otherwise normal. Patient is accompanied by her caregivers from [facility]. Per note from facility, Ruma has had no issues with her feeding tube. No nausea/vomiting. Having a formed to soft BM daily without any signs of bleeding. Does not seem to have any abdominal discomfort. They are enquiring about Nexium, still on twice daily dosing. They are also wondering if she needs another EGD.    Prior GI evaluation:  EGD 2/19/2024: Gastric ulcer with retained blood in stomach  EGD 2/22/2024: gastritic ulcer with no bleeding seen. Biopsies showed chronic gastritis with intestinal metaplasia. No evidence of H. pylori    Review of Systems:  Unable to be determined due to mental status.    Medications:  Prior to Admission medications    Medication Sig Start Date End Date Taking? Authorizing Provider   esomeprazole (NexIUM) 40 mg packet  4/20/24  Yes Historical Provider, MD   acetaminophen (Tylenol) 160 mg/5 mL elixir by g-tube route every 4 hours if needed.    Historical Provider, MD    albuterol (Ventolin HFA) 90 mcg/actuation inhaler Inhale 2 puffs every 1 hour if needed for other (respiratory distress).    Historical Provider, MD   ascorbic acid (Vitamin C) 250 mg tablet 1 tablet (250 mg) by g-tube route 2 times a day.    Historical Provider, MD   baclofen (Lioresal) 10 mg tablet 1.5 tablets (15 mg) by g-tube route 3 times a day.    Historical Provider, MD   bisacodyl (Dulcolax) 10 mg suppository Insert 1 suppository (10 mg) into the rectum if needed.    Historical Provider, MD   calcium carbonate-vitamin D3 500 mg-5 mcg (200 unit) tablet 1 tablet by g-tube route once daily.    Historical Provider, MD   cholecalciferol (Vitamin D3) 25 MCG (1000 UT) tablet 1 tablet (1,000 Units) by g-tube route once daily.    Historical Provider, MD   diazePAM (Diastat Acudial) 5-7.5-10 mg rectal kit Insert 10 mg into the rectum every 6 hours if needed for seizures. Prior to administration, review instruction sheet supplied with dose unit. Verify the ordered dose is set for administration.    Historical Provider, MD   fluticasone (Flovent HFA) 110 mcg/actuation inhaler Inhale 2 puffs 2 times a day. Rinse mouth with water after use to reduce aftertaste and incidence of candidiasis. Do not swallow.    Historical Provider, MD   ibuprofen (Children's Ibuprofen) 100 mg/5 mL suspension by g-tube route every 6 hours.    Historical Provider, MD   methenamine hippurate (Hiprex) 1 gram tablet Take 1 tablet (1 g) by mouth 2 times a day.  Patient taking differently: 1 tablet (1 g) by g-tube route 2 times a day. 12/12/23 12/11/24  Zachary Frederick MD   multivitamin tablet 1 tablet by g-tube route once daily.    Historical Provider, MD   pantoprazole (ProtoNix) 40 mg packet 1 packet (40 mg) by g-tube route 2 times a day before meals. 2/23/24 5/23/24  Shay Radford MD   PHENobarbitaL 64.8 mg tablet 1 tablet (64.8 mg) by g-tube route once daily in the morning.    Historical Provider, MD   PHENobarbitaL 64.8 mg tablet 1.5  tablets (97.2 mg) by g-tube route once daily in the evening. Take with meals.    Historical Provider, MD   polyethylene glycol (Glycolax, Miralax) 17 gram packet One packet (17g) via G tube, daily 2/23/24   Shay Radford MD       Allergies:  Patient has no known allergies.    Past Medical History:  She has a past medical history of Arthritis, Asthma, Cerebral palsy, Cognitive disorder, GERD (gastroesophageal reflux disease), Scoliosis, Seizure disorder (Multi), and Spinal stenosis.    Past Surgical History:  She has a past surgical history that includes Other surgical history (07/12/2022); Other surgical history (07/12/2022); Other surgical history (07/12/2022); and Other surgical history (07/12/2022).    Social History:  She reports that she has never smoked. She has never used smokeless tobacco. She reports that she does not drink alcohol and does not use drugs.    Objective   Physical exam:  Constitutional: Well developed, well nourished 44 y.o. year old in no acute distress. In wheelchair, nonverbal.  Skin: Warm and dry. Normal turgor. No rash, ulcer, trauma, jaundice.   Eyes: Pupils symmetric and reactive to light.  Respiratory: Clear to auscultation bilaterally. No wheezes, rhonchi, or rales heard.  Cardiovascular: Regular rate and rhythm. S1 and S2 appreciated and normal. No murmur, rub, or gallop heard.   Edema: No edema noted.  GI: Normal bowel sounds. Soft, non-distended, appears non-tender. No rebound or guarding present. No hepatomegaly or splenomegaly appreciated. Abdominal aorta not palpably abnormal. G tube in place.   Psych: Unable to assess        Relevant Results Recent labs reviewed in the EMR.  Lab Results   Component Value Date    HGB 12.2 02/23/2024    HGB 10.8 (L) 02/21/2024    HGB 11.4 (L) 02/19/2024    MCV 96 02/23/2024    MCV 97 02/21/2024     02/18/2024     02/23/2024     02/21/2024     02/18/2024       Lab Results   Component Value Date    FERRITIN 29  "01/12/2024    IRON 45 02/19/2024       Lab Results   Component Value Date     02/23/2024    K 3.1 (L) 02/23/2024     02/23/2024    BUN 7 02/23/2024    CREATININE 0.31 (L) 02/23/2024       Lab Results   Component Value Date    BILITOT 0.4 07/31/2024     Lab Results   Component Value Date    ALT 58 (H) 07/31/2024    ALT 80 (H) 02/18/2024    ALT 36 01/12/2024    ALT 36 01/12/2024    AST 37 07/31/2024    AST 61 (H) 02/18/2024    AST 30 01/12/2024    AST 30 01/12/2024    ALKPHOS 209 (H) 07/31/2024    ALKPHOS 161 (H) 02/18/2024    ALKPHOS 169 (H) 01/12/2024    ALKPHOS 169 (H) 01/12/2024       Lab Results   Component Value Date    CRP 0.41 01/12/2024    CRP 0.57 01/13/2023    CRP 1.17 (A) 01/19/2022    CRP 0.74 01/22/2021       No results found for: \"CALPS\"    Radiology: Reviewed imaging reviewed in the EMR.  No results found.    Assessment/Plan   Problem List Items Addressed This Visit             ICD-10-CM    Gastric ulcer - Primary K25.9     Patient has been doing well. Most recent EGD in June 2024 showed some residual gastritis, but gastric ulcer has healed. No report of any further bleeding. She has been maintained on BID PPI. Discussed that she no longer needs that high of dosing. I recommended she continuing Nexium 40mg once daily through PEG tube for prophylaxis. New prescription printed. Routine EGDs not needed unless she has signs of bleeding, which were written down for facility. She will follow up in 1 year, or sooner if needed.         Relevant Medications    esomeprazole (NexIUM Packet) 40 mg packet    Other Relevant Orders    Follow Up In Gastroenterology           Bev Todd PA-C    "

## 2025-01-07 ENCOUNTER — APPOINTMENT (OUTPATIENT)
Facility: CLINIC | Age: 45
End: 2025-01-07
Payer: COMMERCIAL

## 2025-01-07 VITALS
BODY MASS INDEX: 30.54 KG/M2 | WEIGHT: 100.09 LBS | HEART RATE: 53 BPM | SYSTOLIC BLOOD PRESSURE: 110 MMHG | DIASTOLIC BLOOD PRESSURE: 62 MMHG

## 2025-01-07 DIAGNOSIS — K25.9 GASTRIC ULCER WITHOUT HEMORRHAGE OR PERFORATION, UNSPECIFIED CHRONICITY: Primary | ICD-10-CM

## 2025-01-07 PROCEDURE — 1036F TOBACCO NON-USER: CPT | Performed by: PHYSICIAN ASSISTANT

## 2025-01-07 PROCEDURE — 99214 OFFICE O/P EST MOD 30 MIN: CPT | Performed by: PHYSICIAN ASSISTANT

## 2025-01-07 RX ORDER — ESOMEPRAZOLE MAGNESIUM 40 MG/1
GRANULE, DELAYED RELEASE ORAL
Qty: 30 PACKET | Refills: 11 | Status: SHIPPED | OUTPATIENT
Start: 2025-01-07

## 2025-01-07 NOTE — PATIENT INSTRUCTIONS
Thank you for coming in for your appointment.    -Decrease nexium to once daily, new prescription sent.    Follow up in 1 year, or sooner if needed

## 2025-01-07 NOTE — ASSESSMENT & PLAN NOTE
Patient has been doing well. Most recent EGD in June 2024 showed some residual gastritis, but gastric ulcer has healed. No report of any further bleeding. She has been maintained on BID PPI. Discussed that she no longer needs that high of dosing. I recommended she continuing Nexium 40mg once daily through PEG tube for prophylaxis. New prescription printed. Routine EGDs not needed unless she has signs of bleeding, which were written down for facility. She will follow up in 1 year, or sooner if needed.

## 2025-01-17 ENCOUNTER — LAB REQUISITION (OUTPATIENT)
Dept: LAB | Facility: HOSPITAL | Age: 45
End: 2025-01-17
Payer: MEDICARE

## 2025-01-17 DIAGNOSIS — E55.9 VITAMIN D DEFICIENCY, UNSPECIFIED: ICD-10-CM

## 2025-01-17 DIAGNOSIS — D64.9 ANEMIA, UNSPECIFIED: ICD-10-CM

## 2025-01-17 DIAGNOSIS — Z51.81 ENCOUNTER FOR THERAPEUTIC DRUG LEVEL MONITORING: ICD-10-CM

## 2025-01-17 DIAGNOSIS — G40.911 EPILEPSY, UNSPECIFIED, INTRACTABLE, WITH STATUS EPILEPTICUS: ICD-10-CM

## 2025-01-17 DIAGNOSIS — Z79.899 OTHER LONG TERM (CURRENT) DRUG THERAPY: ICD-10-CM

## 2025-01-17 DIAGNOSIS — K21.9 GASTRO-ESOPHAGEAL REFLUX DISEASE WITHOUT ESOPHAGITIS: ICD-10-CM

## 2025-01-17 DIAGNOSIS — G80.0 SPASTIC QUADRIPLEGIC CEREBRAL PALSY (MULTI): ICD-10-CM

## 2025-01-17 LAB
25(OH)D3 SERPL-MCNC: 39 NG/ML (ref 30–100)
ALBUMIN SERPL BCP-MCNC: 3.5 G/DL (ref 3.4–5)
ALP SERPL-CCNC: 244 U/L (ref 33–110)
ALT SERPL W P-5'-P-CCNC: 34 U/L (ref 7–45)
ANION GAP SERPL CALC-SCNC: 14 MMOL/L (ref 10–20)
AST SERPL W P-5'-P-CCNC: 32 U/L (ref 9–39)
BASOPHILS # BLD AUTO: 0.05 X10*3/UL (ref 0–0.1)
BASOPHILS NFR BLD AUTO: 0.7 %
BILIRUB DIRECT SERPL-MCNC: 0.1 MG/DL (ref 0–0.3)
BILIRUB SERPL-MCNC: 0.3 MG/DL (ref 0–1.2)
BUN SERPL-MCNC: 11 MG/DL (ref 6–23)
CALCIUM SERPL-MCNC: 8.7 MG/DL (ref 8.6–10.3)
CHLORIDE SERPL-SCNC: 100 MMOL/L (ref 98–107)
CHOLEST SERPL-MCNC: 236 MG/DL (ref 0–199)
CHOLESTEROL/HDL RATIO: 2.9
CO2 SERPL-SCNC: 28 MMOL/L (ref 21–32)
CREAT SERPL-MCNC: 0.29 MG/DL (ref 0.5–1.05)
CRP SERPL-MCNC: 0.41 MG/DL
EGFRCR SERPLBLD CKD-EPI 2021: >90 ML/MIN/1.73M*2
EOSINOPHIL # BLD AUTO: 0.24 X10*3/UL (ref 0–0.7)
EOSINOPHIL NFR BLD AUTO: 3.4 %
ERYTHROCYTE [DISTWIDTH] IN BLOOD BY AUTOMATED COUNT: 13.2 % (ref 11.5–14.5)
FERRITIN SERPL-MCNC: 21 NG/ML (ref 8–150)
GLUCOSE SERPL-MCNC: 75 MG/DL (ref 74–99)
HCT VFR BLD AUTO: 45.6 % (ref 36–46)
HDLC SERPL-MCNC: 82.7 MG/DL
HGB BLD-MCNC: 14.7 G/DL (ref 12–16)
IMM GRANULOCYTES # BLD AUTO: 0.02 X10*3/UL (ref 0–0.7)
IMM GRANULOCYTES NFR BLD AUTO: 0.3 % (ref 0–0.9)
IRON SATN MFR SERPL: 27 % (ref 25–45)
IRON SERPL-MCNC: 99 UG/DL (ref 35–150)
LDLC SERPL CALC-MCNC: 141 MG/DL
LYMPHOCYTES # BLD AUTO: 3.42 X10*3/UL (ref 1.2–4.8)
LYMPHOCYTES NFR BLD AUTO: 49 %
MAGNESIUM SERPL-MCNC: 1.97 MG/DL (ref 1.6–2.4)
MCH RBC QN AUTO: 32.7 PG (ref 26–34)
MCHC RBC AUTO-ENTMCNC: 32.2 G/DL (ref 32–36)
MCV RBC AUTO: 102 FL (ref 80–100)
MONOCYTES # BLD AUTO: 0.84 X10*3/UL (ref 0.1–1)
MONOCYTES NFR BLD AUTO: 12 %
NEUTROPHILS # BLD AUTO: 2.41 X10*3/UL (ref 1.2–7.7)
NEUTROPHILS NFR BLD AUTO: 34.6 %
NON HDL CHOLESTEROL: 153 MG/DL (ref 0–149)
NRBC BLD-RTO: 0 /100 WBCS (ref 0–0)
PHENOBARB SERPL-MCNC: 30.4 UG/ML (ref 10–40)
PHOSPHATE SERPL-MCNC: 3.6 MG/DL (ref 2.5–4.9)
PLATELET # BLD AUTO: 277 X10*3/UL (ref 150–450)
POTASSIUM SERPL-SCNC: 4.9 MMOL/L (ref 3.5–5.3)
PROT SERPL-MCNC: 6.7 G/DL (ref 6.4–8.2)
RBC # BLD AUTO: 4.49 X10*6/UL (ref 4–5.2)
SODIUM SERPL-SCNC: 137 MMOL/L (ref 136–145)
TIBC SERPL-MCNC: 365 UG/DL (ref 240–445)
TRIGL SERPL-MCNC: 62 MG/DL (ref 0–149)
UIBC SERPL-MCNC: 266 UG/DL (ref 110–370)
VLDL: 12 MG/DL (ref 0–40)
WBC # BLD AUTO: 7 X10*3/UL (ref 4.4–11.3)

## 2025-01-17 PROCEDURE — 82306 VITAMIN D 25 HYDROXY: CPT | Mod: OUT | Performed by: PEDIATRICS

## 2025-01-17 PROCEDURE — 36415 COLL VENOUS BLD VENIPUNCTURE: CPT | Mod: OUT | Performed by: PEDIATRICS

## 2025-01-17 PROCEDURE — 84100 ASSAY OF PHOSPHORUS: CPT | Mod: OUT | Performed by: PEDIATRICS

## 2025-01-17 PROCEDURE — 83735 ASSAY OF MAGNESIUM: CPT | Mod: OUT | Performed by: PEDIATRICS

## 2025-01-17 PROCEDURE — 82565 ASSAY OF CREATININE: CPT | Mod: OUT | Performed by: PEDIATRICS

## 2025-01-17 PROCEDURE — 85025 COMPLETE CBC W/AUTO DIFF WBC: CPT | Mod: OUT | Performed by: PEDIATRICS

## 2025-01-17 PROCEDURE — 80061 LIPID PANEL: CPT | Mod: OUT | Performed by: PEDIATRICS

## 2025-01-17 PROCEDURE — 82248 BILIRUBIN DIRECT: CPT | Mod: OUT | Performed by: PEDIATRICS

## 2025-01-17 PROCEDURE — 83540 ASSAY OF IRON: CPT | Mod: OUT | Performed by: PEDIATRICS

## 2025-01-17 PROCEDURE — 80184 ASSAY OF PHENOBARBITAL: CPT | Mod: OUT | Performed by: PEDIATRICS

## 2025-01-17 PROCEDURE — 83036 HEMOGLOBIN GLYCOSYLATED A1C: CPT | Mod: OUT,PORLAB | Performed by: PEDIATRICS

## 2025-01-17 PROCEDURE — 82728 ASSAY OF FERRITIN: CPT | Mod: OUT | Performed by: PEDIATRICS

## 2025-01-17 PROCEDURE — 86140 C-REACTIVE PROTEIN: CPT | Mod: OUT | Performed by: PEDIATRICS

## 2025-01-18 LAB
EST. AVERAGE GLUCOSE BLD GHB EST-MCNC: 97 MG/DL
HBA1C MFR BLD: 5 %

## 2025-02-07 ENCOUNTER — LAB REQUISITION (OUTPATIENT)
Dept: LAB | Facility: HOSPITAL | Age: 45
End: 2025-02-07
Payer: MEDICAID

## 2025-02-07 DIAGNOSIS — R05.9 COUGH, UNSPECIFIED: ICD-10-CM

## 2025-02-07 PROCEDURE — 87636 SARSCOV2 & INF A&B AMP PRB: CPT

## 2025-02-07 PROCEDURE — 87798 DETECT AGENT NOS DNA AMP: CPT | Mod: OUT,PORLAB | Performed by: PEDIATRICS

## 2025-02-07 PROCEDURE — 87636 SARSCOV2 & INF A&B AMP PRB: CPT | Mod: OUT,PORLAB | Performed by: PEDIATRICS

## 2025-02-07 PROCEDURE — 87798 DETECT AGENT NOS DNA AMP: CPT

## 2025-03-06 ENCOUNTER — LAB REQUISITION (OUTPATIENT)
Dept: LAB | Facility: HOSPITAL | Age: 45
End: 2025-03-06
Payer: MEDICARE

## 2025-03-06 DIAGNOSIS — R50.9 FEVER, UNSPECIFIED: ICD-10-CM

## 2025-03-06 DIAGNOSIS — R05.9 COUGH, UNSPECIFIED: ICD-10-CM

## 2025-03-06 LAB — SARS-COV-2 RNA RESP QL NAA+PROBE: DETECTED

## 2025-03-06 PROCEDURE — 87635 SARS-COV-2 COVID-19 AMP PRB: CPT | Mod: OUT,PORLAB | Performed by: PEDIATRICS

## 2025-07-16 ENCOUNTER — LAB REQUISITION (OUTPATIENT)
Dept: LAB | Facility: HOSPITAL | Age: 45
End: 2025-07-16
Payer: MEDICAID

## 2025-07-16 DIAGNOSIS — Z51.81 ENCOUNTER FOR THERAPEUTIC DRUG LEVEL MONITORING: ICD-10-CM

## 2025-07-16 DIAGNOSIS — Z79.899 OTHER LONG TERM (CURRENT) DRUG THERAPY: ICD-10-CM

## 2025-07-16 DIAGNOSIS — G40.911 EPILEPSY, UNSPECIFIED, INTRACTABLE, WITH STATUS EPILEPTICUS: ICD-10-CM

## 2025-07-16 LAB
ALBUMIN SERPL BCP-MCNC: 3.3 G/DL (ref 3.4–5)
ALP SERPL-CCNC: 195 U/L (ref 33–110)
ALT SERPL W P-5'-P-CCNC: 35 U/L (ref 7–45)
AST SERPL W P-5'-P-CCNC: 30 U/L (ref 9–39)
BILIRUB DIRECT SERPL-MCNC: 0 MG/DL (ref 0–0.3)
BILIRUB SERPL-MCNC: 0.2 MG/DL (ref 0–1.2)
PHENOBARB SERPL-MCNC: 33.1 UG/ML (ref 10–40)
PROT SERPL-MCNC: 6.4 G/DL (ref 6.4–8.2)

## 2025-07-16 PROCEDURE — 36415 COLL VENOUS BLD VENIPUNCTURE: CPT | Mod: OUT | Performed by: PEDIATRICS

## 2025-07-16 PROCEDURE — 84075 ASSAY ALKALINE PHOSPHATASE: CPT | Mod: OUT | Performed by: PEDIATRICS

## 2025-07-16 PROCEDURE — 80184 ASSAY OF PHENOBARBITAL: CPT | Mod: OUT | Performed by: PEDIATRICS

## 2025-12-02 ENCOUNTER — APPOINTMENT (OUTPATIENT)
Facility: HOSPITAL | Age: 45
End: 2025-12-02
Payer: MEDICARE